# Patient Record
Sex: MALE | Race: BLACK OR AFRICAN AMERICAN | Employment: FULL TIME
[De-identification: names, ages, dates, MRNs, and addresses within clinical notes are randomized per-mention and may not be internally consistent; named-entity substitution may affect disease eponyms.]

---

## 2017-01-31 ENCOUNTER — TELEPHONE (OUTPATIENT)
Dept: INTERNAL MEDICINE | Facility: CLINIC | Age: 47
End: 2017-01-31

## 2017-01-31 ENCOUNTER — OFFICE VISIT (OUTPATIENT)
Dept: INTERNAL MEDICINE | Facility: CLINIC | Age: 47
End: 2017-01-31

## 2017-01-31 VITALS
TEMPERATURE: 98.4 F | DIASTOLIC BLOOD PRESSURE: 80 MMHG | OXYGEN SATURATION: 97 % | RESPIRATION RATE: 18 BRPM | WEIGHT: 303 LBS | BODY MASS INDEX: 38.89 KG/M2 | HEIGHT: 74 IN | SYSTOLIC BLOOD PRESSURE: 124 MMHG | HEART RATE: 68 BPM

## 2017-01-31 DIAGNOSIS — Z23 NEED FOR PNEUMOCOCCAL VACCINATION: ICD-10-CM

## 2017-01-31 DIAGNOSIS — G47.33 OSA ON CPAP: ICD-10-CM

## 2017-01-31 DIAGNOSIS — E66.9 OBESITY (BMI 35.0-39.9 WITHOUT COMORBIDITY): ICD-10-CM

## 2017-01-31 DIAGNOSIS — Z99.89 OSA ON CPAP: ICD-10-CM

## 2017-01-31 DIAGNOSIS — R42 VERTIGO: ICD-10-CM

## 2017-01-31 DIAGNOSIS — E11.9 TYPE 2 DIABETES MELLITUS WITHOUT COMPLICATION, WITHOUT LONG-TERM CURRENT USE OF INSULIN (HCC): ICD-10-CM

## 2017-01-31 DIAGNOSIS — H81.10 BPPV (BENIGN PAROXYSMAL POSITIONAL VERTIGO), UNSPECIFIED LATERALITY: Primary | ICD-10-CM

## 2017-01-31 DIAGNOSIS — R42 DIZZINESS: Primary | ICD-10-CM

## 2017-01-31 PROCEDURE — 90471 IMMUNIZATION ADMIN: CPT | Performed by: NURSE PRACTITIONER

## 2017-01-31 PROCEDURE — 90670 PCV13 VACCINE IM: CPT | Performed by: NURSE PRACTITIONER

## 2017-01-31 PROCEDURE — 99203 OFFICE O/P NEW LOW 30 MIN: CPT | Performed by: NURSE PRACTITIONER

## 2017-01-31 RX ORDER — MECLIZINE HYDROCHLORIDE 25 MG/1
25 TABLET ORAL
COMMUNITY
Start: 2017-01-28 | End: 2017-02-27

## 2017-01-31 RX ORDER — SCOLOPAMINE TRANSDERMAL SYSTEM 1 MG/1
1 PATCH, EXTENDED RELEASE TRANSDERMAL
Qty: 10 PATCH | Refills: 0 | Status: SHIPPED | OUTPATIENT
Start: 2017-01-31 | End: 2017-03-03 | Stop reason: RX

## 2017-01-31 ASSESSMENT — ENCOUNTER SYMPTOMS
SHORTNESS OF BREATH: 0
PHOTOPHOBIA: 0
CONSTIPATION: 0
COUGH: 0
DIARRHEA: 0
VISUAL CHANGE: 0
RHINORRHEA: 0
SORE THROAT: 0

## 2017-02-16 ENCOUNTER — TELEPHONE (OUTPATIENT)
Dept: INTERNAL MEDICINE | Facility: CLINIC | Age: 47
End: 2017-02-16

## 2017-03-03 ENCOUNTER — OFFICE VISIT (OUTPATIENT)
Dept: INTERNAL MEDICINE | Facility: CLINIC | Age: 47
End: 2017-03-03

## 2017-03-03 VITALS
HEIGHT: 74 IN | BODY MASS INDEX: 38.89 KG/M2 | HEART RATE: 76 BPM | RESPIRATION RATE: 18 BRPM | DIASTOLIC BLOOD PRESSURE: 80 MMHG | WEIGHT: 303 LBS | SYSTOLIC BLOOD PRESSURE: 105 MMHG

## 2017-03-03 DIAGNOSIS — E11.9 TYPE 2 DIABETES MELLITUS WITHOUT COMPLICATION, WITHOUT LONG-TERM CURRENT USE OF INSULIN (HCC): Chronic | ICD-10-CM

## 2017-03-03 DIAGNOSIS — Z13.1 DM (DIABETES MELLITUS SCREEN): Primary | ICD-10-CM

## 2017-03-03 DIAGNOSIS — E55.9 VITAMIN D DEFICIENCY: ICD-10-CM

## 2017-03-03 LAB — HBA1C MFR BLD: 7.9 %

## 2017-03-03 PROCEDURE — 99214 OFFICE O/P EST MOD 30 MIN: CPT | Performed by: INTERNAL MEDICINE

## 2017-03-03 PROCEDURE — 83036 HEMOGLOBIN GLYCOSYLATED A1C: CPT | Performed by: INTERNAL MEDICINE

## 2017-03-03 RX ORDER — ERGOCALCIFEROL 1.25 MG/1
50000 CAPSULE ORAL WEEKLY
Qty: 6 CAPSULE | Refills: 0 | Status: SHIPPED | OUTPATIENT
Start: 2017-03-03 | End: 2017-11-09

## 2017-03-03 ASSESSMENT — ENCOUNTER SYMPTOMS
SHORTNESS OF BREATH: 0
NAUSEA: 0
BACK PAIN: 0
EYE REDNESS: 0
COUGH: 0
EYE DISCHARGE: 0
SORE THROAT: 0
ABDOMINAL PAIN: 0
TROUBLE SWALLOWING: 0
VOMITING: 0

## 2017-04-05 ENCOUNTER — HOSPITAL ENCOUNTER (OUTPATIENT)
Age: 47
Setting detail: SPECIMEN
Discharge: HOME | End: 2017-04-05
Payer: OTHER GOVERNMENT

## 2017-04-05 ENCOUNTER — OFFICE VISIT (OUTPATIENT)
Dept: INTERNAL MEDICINE CLINIC | Age: 47
End: 2017-04-05
Payer: OTHER GOVERNMENT

## 2017-04-05 VITALS
TEMPERATURE: 99.1 F | BODY MASS INDEX: 39.86 KG/M2 | HEIGHT: 74 IN | RESPIRATION RATE: 17 BRPM | OXYGEN SATURATION: 98 % | WEIGHT: 310.6 LBS | HEART RATE: 78 BPM | SYSTOLIC BLOOD PRESSURE: 138 MMHG | DIASTOLIC BLOOD PRESSURE: 90 MMHG

## 2017-04-05 DIAGNOSIS — E11.9 TYPE 2 DIABETES MELLITUS WITHOUT COMPLICATION, WITHOUT LONG-TERM CURRENT USE OF INSULIN (HCC): Primary | Chronic | ICD-10-CM

## 2017-04-05 DIAGNOSIS — G47.33 OSA ON CPAP: ICD-10-CM

## 2017-04-05 DIAGNOSIS — Z99.89 OSA ON CPAP: ICD-10-CM

## 2017-04-05 DIAGNOSIS — Z13.9 SCREENING: ICD-10-CM

## 2017-04-05 DIAGNOSIS — N20.0 KIDNEY STONE: ICD-10-CM

## 2017-04-05 DIAGNOSIS — E11.9 TYPE 2 DIABETES MELLITUS WITHOUT COMPLICATION, WITHOUT LONG-TERM CURRENT USE OF INSULIN (HCC): Chronic | ICD-10-CM

## 2017-04-05 DIAGNOSIS — E55.9 VITAMIN D DEFICIENCY: ICD-10-CM

## 2017-04-05 DIAGNOSIS — M23.92 INTERNAL DERANGEMENT OF BOTH KNEES: ICD-10-CM

## 2017-04-05 DIAGNOSIS — M23.91 INTERNAL DERANGEMENT OF BOTH KNEES: ICD-10-CM

## 2017-04-05 LAB
ALBUMIN SERPL-MCNC: 4.4 G/DL (ref 3.5–5.2)
ALBUMIN/GLOBULIN RATIO: 1.5 (ref 1–2.5)
ALP BLD-CCNC: 85 U/L (ref 40–129)
ALT SERPL-CCNC: 28 U/L (ref 5–41)
ANION GAP SERPL CALCULATED.3IONS-SCNC: 14 MMOL/L (ref 9–17)
AST SERPL-CCNC: 18 U/L
BILIRUB SERPL-MCNC: 0.31 MG/DL (ref 0.3–1.2)
BUN BLDV-MCNC: 17 MG/DL (ref 6–20)
BUN/CREAT BLD: ABNORMAL (ref 9–20)
CALCIUM SERPL-MCNC: 9.4 MG/DL (ref 8.6–10.4)
CHLORIDE BLD-SCNC: 101 MMOL/L (ref 98–107)
CHOLESTEROL/HDL RATIO: 3.9
CHOLESTEROL: 154 MG/DL
CO2: 26 MMOL/L (ref 20–31)
CREAT SERPL-MCNC: 1.05 MG/DL (ref 0.7–1.2)
GFR AFRICAN AMERICAN: >60 ML/MIN
GFR NON-AFRICAN AMERICAN: >60 ML/MIN
GFR SERPL CREATININE-BSD FRML MDRD: ABNORMAL ML/MIN/{1.73_M2}
GFR SERPL CREATININE-BSD FRML MDRD: ABNORMAL ML/MIN/{1.73_M2}
GLUCOSE BLD-MCNC: 124 MG/DL (ref 70–99)
HDLC SERPL-MCNC: 39 MG/DL
LDL CHOLESTEROL: 89 MG/DL (ref 0–130)
POTASSIUM SERPL-SCNC: 4.7 MMOL/L (ref 3.7–5.3)
PROSTATE SPECIFIC ANTIGEN: 0.51 UG/L
SODIUM BLD-SCNC: 141 MMOL/L (ref 135–144)
TOTAL PROTEIN: 7.4 G/DL (ref 6.4–8.3)
TRIGL SERPL-MCNC: 132 MG/DL
VLDLC SERPL CALC-MCNC: ABNORMAL MG/DL (ref 1–30)

## 2017-04-05 PROCEDURE — 99214 OFFICE O/P EST MOD 30 MIN: CPT | Performed by: FAMILY MEDICINE

## 2017-04-05 RX ORDER — METFORMIN HYDROCHLORIDE 500 MG/1
TABLET, EXTENDED RELEASE ORAL
Qty: 60 TABLET | Refills: 5 | Status: SHIPPED | COMMUNITY
Start: 2017-04-05 | End: 2017-04-24 | Stop reason: SDUPTHER

## 2017-04-05 RX ORDER — VALSARTAN 80 MG/1
80 TABLET ORAL DAILY
Qty: 30 TABLET | Refills: 3 | Status: SHIPPED | OUTPATIENT
Start: 2017-04-05 | End: 2017-11-09

## 2017-04-05 RX ORDER — METFORMIN HYDROCHLORIDE 500 MG/1
TABLET, EXTENDED RELEASE ORAL
COMMUNITY
Start: 2017-03-25 | End: 2017-04-05 | Stop reason: DRUGHIGH

## 2017-04-05 RX ORDER — BLOOD-GLUCOSE METER
KIT MISCELLANEOUS
Refills: 0 | COMMUNITY
Start: 2017-01-31 | End: 2017-11-09

## 2017-04-05 ASSESSMENT — ENCOUNTER SYMPTOMS
COLOR CHANGE: 0
TROUBLE SWALLOWING: 0
EYE PAIN: 0
CONSTIPATION: 0
BACK PAIN: 0
EYE DISCHARGE: 0
ANAL BLEEDING: 0
SHORTNESS OF BREATH: 0
EYE REDNESS: 0
ABDOMINAL PAIN: 0
FACIAL SWELLING: 0
COUGH: 0
CHEST TIGHTNESS: 0
STRIDOR: 0
WHEEZING: 0
SORE THROAT: 0
ABDOMINAL DISTENTION: 0

## 2017-04-17 ENCOUNTER — TELEPHONE (OUTPATIENT)
Dept: INTERNAL MEDICINE CLINIC | Age: 47
End: 2017-04-17

## 2017-04-27 RX ORDER — METFORMIN HYDROCHLORIDE 500 MG/1
TABLET, EXTENDED RELEASE ORAL
Qty: 60 TABLET | Refills: 0 | Status: SHIPPED | OUTPATIENT
Start: 2017-04-27 | End: 2017-10-27 | Stop reason: SDUPTHER

## 2017-05-12 DIAGNOSIS — E55.9 VITAMIN D DEFICIENCY: ICD-10-CM

## 2017-05-15 RX ORDER — ERGOCALCIFEROL 1.25 MG/1
CAPSULE ORAL
Qty: 4 CAPSULE | Refills: 3 | Status: SHIPPED | OUTPATIENT
Start: 2017-05-15 | End: 2017-11-09

## 2017-09-12 ENCOUNTER — TELEPHONE (OUTPATIENT)
Dept: PRIMARY CARE CLINIC | Age: 47
End: 2017-09-12

## 2017-10-27 NOTE — TELEPHONE ENCOUNTER
Pt requesting refills on Metformin. Pharmacy on record is correct. Any questions patient's phone number is [phone].

## 2017-10-30 RX ORDER — METFORMIN HYDROCHLORIDE 500 MG/1
TABLET, EXTENDED RELEASE ORAL
Qty: 60 TABLET | Refills: 0 | Status: SHIPPED | OUTPATIENT
Start: 2017-10-30 | End: 2017-11-27 | Stop reason: SDUPTHER

## 2017-11-09 ENCOUNTER — OFFICE VISIT (OUTPATIENT)
Dept: INTERNAL MEDICINE CLINIC | Age: 47
End: 2017-11-09
Payer: OTHER GOVERNMENT

## 2017-11-09 VITALS
OXYGEN SATURATION: 98 % | HEART RATE: 78 BPM | RESPIRATION RATE: 17 BRPM | SYSTOLIC BLOOD PRESSURE: 130 MMHG | BODY MASS INDEX: 40.43 KG/M2 | HEIGHT: 74 IN | DIASTOLIC BLOOD PRESSURE: 80 MMHG | WEIGHT: 315 LBS

## 2017-11-09 DIAGNOSIS — Z99.89 OSA ON CPAP: ICD-10-CM

## 2017-11-09 DIAGNOSIS — E66.9 OBESITY (BMI 35.0-39.9 WITHOUT COMORBIDITY): ICD-10-CM

## 2017-11-09 DIAGNOSIS — E78.49 OTHER HYPERLIPIDEMIA: ICD-10-CM

## 2017-11-09 DIAGNOSIS — I10 ESSENTIAL HYPERTENSION: ICD-10-CM

## 2017-11-09 DIAGNOSIS — E11.9 CONTROLLED TYPE 2 DIABETES MELLITUS WITHOUT COMPLICATION, WITHOUT LONG-TERM CURRENT USE OF INSULIN (HCC): Primary | ICD-10-CM

## 2017-11-09 DIAGNOSIS — G47.33 OSA ON CPAP: ICD-10-CM

## 2017-11-09 PROBLEM — L29.9 ITCHING: Status: ACTIVE | Noted: 2017-10-16
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
## 2017-11-09 PROCEDURE — 90472 IMMUNIZATION ADMIN EACH ADD: CPT | Performed by: FAMILY MEDICINE

## 2017-11-09 PROCEDURE — 90732 PPSV23 VACC 2 YRS+ SUBQ/IM: CPT | Performed by: FAMILY MEDICINE

## 2017-11-09 PROCEDURE — 90471 IMMUNIZATION ADMIN: CPT | Performed by: FAMILY MEDICINE

## 2017-11-09 PROCEDURE — 90688 IIV4 VACCINE SPLT 0.5 ML IM: CPT | Performed by: FAMILY MEDICINE

## 2017-11-09 PROCEDURE — 99214 OFFICE O/P EST MOD 30 MIN: CPT | Performed by: FAMILY MEDICINE

## 2017-11-09 RX ORDER — ATORVASTATIN CALCIUM 40 MG/1
40 TABLET, FILM COATED ORAL DAILY
Qty: 30 TABLET | Refills: 3 | Status: SHIPPED | OUTPATIENT
Start: 2017-11-09 | End: 2018-02-09

## 2017-11-09 RX ORDER — CETIRIZINE HYDROCHLORIDE 10 MG/1
TABLET ORAL
Refills: 0 | COMMUNITY
Start: 2017-10-16 | End: 2017-11-09 | Stop reason: SDUPTHER

## 2017-11-09 RX ORDER — ASPIRIN 81 MG/1
81 TABLET, CHEWABLE ORAL DAILY
Qty: 30 TABLET | Refills: 3 | Status: SHIPPED | OUTPATIENT
Start: 2017-11-09 | End: 2018-02-09

## 2017-11-09 RX ORDER — LISINOPRIL 5 MG/1
5 TABLET ORAL DAILY
Qty: 30 TABLET | Refills: 3 | Status: SHIPPED | OUTPATIENT
Start: 2017-11-09 | End: 2018-02-09

## 2017-11-09 ASSESSMENT — ENCOUNTER SYMPTOMS
RESPIRATORY NEGATIVE: 1
GASTROINTESTINAL NEGATIVE: 1
ALLERGIC/IMMUNOLOGIC NEGATIVE: 1
EYES NEGATIVE: 1

## 2017-11-09 ASSESSMENT — PATIENT HEALTH QUESTIONNAIRE - PHQ9
SUM OF ALL RESPONSES TO PHQ9 QUESTIONS 1 & 2: 0
2. FEELING DOWN, DEPRESSED OR HOPELESS: 0
1. LITTLE INTEREST OR PLEASURE IN DOING THINGS: 0
SUM OF ALL RESPONSES TO PHQ QUESTIONS 1-9: 0

## 2017-11-09 NOTE — PROGRESS NOTES
Subjective:      Patient ID: Ar Allan is a 56 y.o. male. Diabetes   He presents for his follow-up diabetic visit. He has type 2 diabetes mellitus. His disease course has been stable. There are no hypoglycemic associated symptoms. There are no diabetic associated symptoms. There are no hypoglycemic complications. Symptoms are stable. There are no diabetic complications. Risk factors for coronary artery disease include diabetes mellitus, dyslipidemia, obesity, male sex and stress. Current diabetic treatment includes oral agent (monotherapy). He is compliant with treatment all of the time. His weight is fluctuating minimally. He is following a generally unhealthy, high fat/cholesterol, high salt and low fiber diet. Meal planning includes ADA exchanges, avoidance of concentrated sweets, calorie counting and carbohydrate counting. He has had a previous visit with a dietitian. He participates in exercise daily. Home blood sugar record trend: He did not bring his Accu-Chek log. An ACE inhibitor/angiotensin II receptor blocker is being taken. Eye exam is current. Review of Systems   Constitutional: Negative. HENT: Negative. Eyes: Negative. Respiratory: Negative. Cardiovascular: Negative. Gastrointestinal: Negative. Endocrine: Negative. Musculoskeletal: Negative. Skin: Negative. Allergic/Immunologic: Negative. Neurological: Negative. Hematological: Negative. Psychiatric/Behavioral: Negative. Past family and social history unremarkable. Objective:   Physical Exam   Constitutional: He is oriented to person, place, and time. He appears well-developed and well-nourished. Morbid obesity with sleep apnea on CPAP   HENT:   Head: Normocephalic and atraumatic.    Right Ear: External ear normal.   Left Ear: External ear normal.   Nose: Nose normal.   Mouth/Throat: Oropharynx is clear and moist.   Eyes: Conjunctivae and EOM are normal. Pupils are equal, round, and reactive to light.   Neck: Normal range of motion. Neck supple. Cardiovascular: Normal rate, regular rhythm, normal heart sounds and intact distal pulses. Pulmonary/Chest: Effort normal and breath sounds normal.   Abdominal: Soft. Bowel sounds are normal.   Musculoskeletal: Normal range of motion. Neurological: He is alert and oriented to person, place, and time. He has normal reflexes. Skin: Skin is warm and dry. Psychiatric: He has a normal mood and affect. His behavior is normal. Thought content normal.       Assessment:      1. Controlled type 2 diabetes mellitus without complication, without long-term current use of insulin ([facility])  Microalbumin, Ur    Hemoglobin A1C    HIV Screen    Hepatitis Panel, Acute    CBC    Comprehensive Metabolic Panel    Lipid Panel    Urinalysis with Microscopic   2. MARCELLUS on CPAP     3. Essential hypertension     4. Other hyperlipidemia     5. Obesity (BMI 35.0-39.9 without comorbidity)             Plan:      64-year-old male who is a male carrier is presented for follow-up. He is afebrile hemodynamically stable, clinical examination is benign. Diabetes mellitus type 2 on metformin that he is tolerating well. Recent past A1c 7.9. I advised him to keep Accu-Chek log for office review, adhere to current regimen, ADA 1800 diet, daily moderate exercise and lifestyle change  Morbid obesity with sleep apnea. We aim to keep his BMI 27 below. I strongly urged him to consider bariatric evaluation. Continue CPAP that he is compliant with. Hypertension well controlled. Consume less than 2 g of salt diet significant loose weight. Hyperlipidemia and start at least tolerating well. He denies tobacco, excessive alcohol or illicit drug use  History of anxiety however denies depression. I offered antianxiety medications that he declined.   Further recommendations to follow lab  Monofilament testing is unremarkable  He is advised to update his annual dilated eye exam  This note is created with

## 2017-11-21 ENCOUNTER — HOSPITAL ENCOUNTER (OUTPATIENT)
Age: 47
Setting detail: SPECIMEN
Discharge: HOME | End: 2017-11-21
Payer: OTHER GOVERNMENT

## 2017-11-21 DIAGNOSIS — E11.9 CONTROLLED TYPE 2 DIABETES MELLITUS WITHOUT COMPLICATION, WITHOUT LONG-TERM CURRENT USE OF INSULIN (HCC): ICD-10-CM

## 2017-11-21 LAB
-: ABNORMAL
ALBUMIN SERPL-MCNC: 4.5 G/DL (ref 3.5–5.2)
ALBUMIN/GLOBULIN RATIO: 1.5 (ref 1–2.5)
ALP BLD-CCNC: 84 U/L (ref 40–129)
ALT SERPL-CCNC: 34 U/L (ref 5–41)
AMORPHOUS: ABNORMAL
ANION GAP SERPL CALCULATED.3IONS-SCNC: 13 MMOL/L (ref 9–17)
AST SERPL-CCNC: 20 U/L
BACTERIA: ABNORMAL
BILIRUB SERPL-MCNC: 0.36 MG/DL (ref 0.3–1.2)
BILIRUBIN URINE: NEGATIVE
BUN BLDV-MCNC: 13 MG/DL (ref 6–20)
BUN/CREAT BLD: ABNORMAL (ref 9–20)
CALCIUM SERPL-MCNC: 9.5 MG/DL (ref 8.6–10.4)
CASTS UA: ABNORMAL /LPF (ref 0–8)
CHLORIDE BLD-SCNC: 99 MMOL/L (ref 98–107)
CHOLESTEROL/HDL RATIO: 4
CHOLESTEROL: 162 MG/DL
CO2: 28 MMOL/L (ref 20–31)
COLOR: ABNORMAL
CREAT SERPL-MCNC: 0.8 MG/DL (ref 0.7–1.2)
CREATININE URINE: 256.8 MG/DL (ref 39–259)
CRYSTALS, UA: ABNORMAL /HPF
EPITHELIAL CELLS UA: ABNORMAL /HPF (ref 0–5)
GFR AFRICAN AMERICAN: >60 ML/MIN
GFR NON-AFRICAN AMERICAN: >60 ML/MIN
GFR SERPL CREATININE-BSD FRML MDRD: ABNORMAL ML/MIN/{1.73_M2}
GFR SERPL CREATININE-BSD FRML MDRD: ABNORMAL ML/MIN/{1.73_M2}
GLUCOSE BLD-MCNC: 121 MG/DL (ref 70–99)
GLUCOSE URINE: NEGATIVE
HAV IGM SER IA-ACNC: NONREACTIVE
HCT VFR BLD CALC: 45.6 % (ref 40.7–50.3)
HDLC SERPL-MCNC: 41 MG/DL
HEMOGLOBIN: 14.3 G/DL (ref 13–17)
HEPATITIS B CORE IGM ANTIBODY: NONREACTIVE
HEPATITIS B SURFACE ANTIGEN: NONREACTIVE
HEPATITIS C ANTIBODY: NONREACTIVE
HIV AG/AB: NONREACTIVE
KETONES, URINE: ABNORMAL
LDL CHOLESTEROL: 103 MG/DL (ref 0–130)
LEUKOCYTE ESTERASE, URINE: NEGATIVE
MCH RBC QN AUTO: 28.3 PG (ref 25.2–33.5)
MCHC RBC AUTO-ENTMCNC: 31.4 G/DL (ref 28.4–34.8)
MCV RBC AUTO: 90.3 FL (ref 82.6–102.9)
MICROALBUMIN/CREAT 24H UR: <12 MG/L
MICROALBUMIN/CREAT UR-RTO: NORMAL MCG/MG CREAT
MUCUS: ABNORMAL
NITRITE, URINE: NEGATIVE
OTHER OBSERVATIONS UA: ABNORMAL
PDW BLD-RTO: 13 % (ref 11.8–14.4)
PH UA: 5 (ref 5–8)
PLATELET # BLD: 247 K/UL (ref 138–453)
PMV BLD AUTO: 11.3 FL (ref 8.1–13.5)
POTASSIUM SERPL-SCNC: 4.2 MMOL/L (ref 3.7–5.3)
PROTEIN UA: NEGATIVE
RBC # BLD: 5.05 M/UL (ref 4.21–5.77)
RBC UA: ABNORMAL /HPF (ref 0–4)
RENAL EPITHELIAL, UA: ABNORMAL /HPF
SODIUM BLD-SCNC: 140 MMOL/L (ref 135–144)
SPECIFIC GRAVITY UA: 1.02 (ref 1–1.03)
TOTAL PROTEIN: 7.6 G/DL (ref 6.4–8.3)
TRICHOMONAS: ABNORMAL
TRIGL SERPL-MCNC: 92 MG/DL
TURBIDITY: CLEAR
URINE HGB: NEGATIVE
UROBILINOGEN, URINE: NORMAL
VLDLC SERPL CALC-MCNC: NORMAL MG/DL (ref 1–30)
WBC # BLD: 6.3 K/UL (ref 3.5–11.3)
WBC UA: ABNORMAL /HPF (ref 0–5)
YEAST: ABNORMAL

## 2017-11-22 LAB
ESTIMATED AVERAGE GLUCOSE: 206 MG/DL
HBA1C MFR BLD: 8.8 % (ref 4–6)

## 2017-11-27 RX ORDER — METFORMIN HYDROCHLORIDE 500 MG/1
500 TABLET, EXTENDED RELEASE ORAL 2 TIMES DAILY
Qty: 60 TABLET | Refills: 3 | Status: SHIPPED | OUTPATIENT
Start: 2017-11-27 | End: 2018-03-29 | Stop reason: SDUPTHER

## 2017-11-27 NOTE — TELEPHONE ENCOUNTER
From: Anna Cardona  Sent: 11/26/2017 10:06 PM EST  Subject: Medication Renewal Request    Jay Emery would like a refill of the following medications:  metFORMIN (GLUCOPHAGE-XR) 500 MG extended release tablet Carl Napier MD]    Preferred pharmacy: [address] [phone] - F [phone]    Comment:

## 2018-01-02 RX ORDER — TAMSULOSIN HYDROCHLORIDE 0.4 MG/1
0.4 CAPSULE ORAL DAILY
Qty: 30 CAPSULE | Refills: 3 | Status: SHIPPED | OUTPATIENT
Start: 2018-01-02 | End: 2018-02-09

## 2018-02-09 ENCOUNTER — OFFICE VISIT (OUTPATIENT)
Dept: INTERNAL MEDICINE CLINIC | Age: 48
End: 2018-02-09
Payer: OTHER GOVERNMENT

## 2018-02-09 VITALS
HEART RATE: 78 BPM | SYSTOLIC BLOOD PRESSURE: 130 MMHG | OXYGEN SATURATION: 98 % | BODY MASS INDEX: 39.58 KG/M2 | WEIGHT: 308.4 LBS | DIASTOLIC BLOOD PRESSURE: 80 MMHG | HEIGHT: 74 IN | RESPIRATION RATE: 17 BRPM

## 2018-02-09 DIAGNOSIS — F39 MOOD DISORDER (HCC): ICD-10-CM

## 2018-02-09 DIAGNOSIS — E78.49 OTHER HYPERLIPIDEMIA: ICD-10-CM

## 2018-02-09 DIAGNOSIS — G47.33 OSA ON CPAP: ICD-10-CM

## 2018-02-09 DIAGNOSIS — Z99.89 OSA ON CPAP: ICD-10-CM

## 2018-02-09 DIAGNOSIS — E55.9 VITAMIN D DEFICIENCY: ICD-10-CM

## 2018-02-09 DIAGNOSIS — Z91.199 NON-COMPLIANT PATIENT: ICD-10-CM

## 2018-02-09 DIAGNOSIS — I10 ESSENTIAL HYPERTENSION: ICD-10-CM

## 2018-02-09 PROCEDURE — 99214 OFFICE O/P EST MOD 30 MIN: CPT | Performed by: FAMILY MEDICINE

## 2018-02-09 NOTE — PROGRESS NOTES
Chronic Disease Visit Information    BP Readings from Last 3 Encounters:   02/09/18 130/80   11/09/17 130/80   04/05/17 (!) 138/90          Hemoglobin A1C (%)   Date Value   11/21/2017 8.8 (H)   03/03/2017 7.9   07/01/2016 7.2     Microalb/Crt. Ratio (mcg/mg creat)   Date Value   11/21/2017 CANNOT BE CALCULATED     LDL Cholesterol (mg/dL)   Date Value   11/21/2017 103     HDL (mg/dL)   Date Value   11/21/2017 41     BUN (mg/dL)   Date Value   11/21/2017 13     CREATININE (mg/dL)   Date Value   11/21/2017 0.80     Glucose (mg/dL)   Date Value   11/21/2017 121 (H)            Have you changed or started any medications since your last visit including any over-the-counter medicines, vitamins, or herbal medicines? no   Are you having any side effects from any of your medications? -  no  Have you stopped taking any of your medications? Is so, why? -  no    Have you seen any other physician or provider since your last visit? No  Have you had any other diagnostic tests since your last visit? No  Have you been seen in the emergency room and/or had an admission to a hospital since we last saw you? No  Have you had your annual diabetic retinal (eye) exam? Yes - Records Requested  Have you had your routine dental cleaning in the past 6 months? yes -     Have you activated your 2nd Watch account? If not, what are your barriers?  Yes     Patient Care Team:  Dayton Souza MD as PCP - General (Family Medicine)         Medical History Review  Past Medical, Family, and Social History reviewed and does contribute to the patient presenting condition    Health Maintenance   Topic Date Due    Diabetic retinal exam  12/20/2018 (Originally 9/15/2016)    Diabetic foot exam  03/03/2018    A1C test (Diabetic or Prediabetic)  11/21/2018    Diabetic microalbuminuria test  11/21/2018    Lipid screen  11/21/2018    Potassium monitoring  11/21/2018    Creatinine monitoring  11/21/2018    DTaP/Tdap/Td vaccine (2 - Td) 05/06/2026    Flu vaccine  Completed    Pneumococcal med risk  Completed    HIV screen  Completed

## 2018-02-12 ASSESSMENT — ENCOUNTER SYMPTOMS
RESPIRATORY NEGATIVE: 1
ALLERGIC/IMMUNOLOGIC NEGATIVE: 1
GASTROINTESTINAL NEGATIVE: 1
EYES NEGATIVE: 1

## 2018-02-12 NOTE — PROGRESS NOTES
Effort normal and breath sounds normal.   Abdominal: Soft. Bowel sounds are normal.   Musculoskeletal: Normal range of motion. Neurological: He is alert and oriented to person, place, and time. He has normal reflexes. Skin: Skin is warm and dry. Psychiatric: He has a normal mood and affect. His behavior is normal. Thought content normal.       Assessment:      1. Uncontrolled type 2 diabetes mellitus without complication, without long-term current use of insulin ([address]     2. Essential hypertension     3. Other hyperlipidemia     4. Vitamin D deficiency     5. MARCELLUS on CPAP     6. Non-compliant patient     7. Mood disorder [facility])             Plan:      52-year-old male returns for follow-up. He is afebrile hemodynamically stable   dental pain for which he is on antibiotic pending evaluation by his dentist  Non-insulin-dependent diabetes mellitus that appears to show worsening. Patient is noncompliant with ADA 1800 diet. Compliance is advised. He is also advised to comply with medications, keep Accu-Chek log for office review, low-fat high-fiber diet, daily moderate exercise, lifestyle change and weight loss to keep BMI 27. A1c has worsened to 8.8. He is normotensive. Blood pressure log equal or less than 130/80. Lose weight and consume less than 2 g of salt a day  Hyperlipidemia and statin that he is tolerating well. Obesity with sleep apnea. Advised to comply with CPAP  Monofilament testing is unremarkable  Is advised to update annual dilated eye exam  Former smoker however, denies ongoing use, he denies excessive alcohol or illicit drug use  Observe and call for any concern  Further recommendations to follow  This note is created with a voice recognition program and while intend to generate a document that accurately reflects the content of the visit, no guarantee can be provided that every mistake has been identified and corrected by editing. Generalized anxiety disorder. Baseline stable.   He denies suicidality  Further recommendations to follow  This note is created with a voice recognition program and while intend to generate a document that accurately reflects the content of the visit, no guarantee can be provided that every mistake has been identified and corrected by editing.

## 2018-03-29 RX ORDER — METFORMIN HYDROCHLORIDE 500 MG/1
500 TABLET, EXTENDED RELEASE ORAL 2 TIMES DAILY
Qty: 60 TABLET | Refills: 3 | Status: SHIPPED | OUTPATIENT
Start: 2018-03-29 | End: 2018-08-01 | Stop reason: SDUPTHER

## 2018-08-02 RX ORDER — METFORMIN HYDROCHLORIDE 500 MG/1
TABLET, EXTENDED RELEASE ORAL
Qty: 60 TABLET | Refills: 0 | Status: SHIPPED | OUTPATIENT
Start: 2018-08-02 | End: 2018-08-14

## 2018-08-02 RX ORDER — METFORMIN HYDROCHLORIDE 500 MG/1
500 TABLET, EXTENDED RELEASE ORAL 2 TIMES DAILY
Qty: 30 TABLET | Refills: 0 | Status: SHIPPED | OUTPATIENT
Start: 2018-08-02 | End: 2018-08-14

## 2018-08-02 RX ORDER — METFORMIN HYDROCHLORIDE 500 MG/1
500 TABLET, EXTENDED RELEASE ORAL 2 TIMES DAILY
Qty: 60 TABLET | Refills: 0 | Status: SHIPPED | OUTPATIENT
Start: 2018-08-02 | End: 2018-08-14 | Stop reason: SDUPTHER

## 2018-08-04 ENCOUNTER — APPOINTMENT (OUTPATIENT)
Dept: GENERAL RADIOLOGY | Age: 48
End: 2018-08-04
Payer: COMMERCIAL

## 2018-08-04 ENCOUNTER — HOSPITAL ENCOUNTER (EMERGENCY)
Age: 48
Discharge: HOME | End: 2018-08-04
Attending: EMERGENCY MEDICINE
Payer: COMMERCIAL

## 2018-08-04 VITALS
RESPIRATION RATE: 18 BRPM | BODY MASS INDEX: 39.14 KG/M2 | TEMPERATURE: 98.4 F | WEIGHT: 305 LBS | DIASTOLIC BLOOD PRESSURE: 73 MMHG | OXYGEN SATURATION: 97 % | HEIGHT: 74 IN | HEART RATE: 73 BPM | SYSTOLIC BLOOD PRESSURE: 134 MMHG

## 2018-08-04 DIAGNOSIS — Z77.098 CHEMICAL EXPOSURE: Primary | ICD-10-CM

## 2018-08-04 PROCEDURE — 71046 X-RAY EXAM CHEST 2 VIEWS: CPT

## 2018-08-04 PROCEDURE — 99283 EMERGENCY DEPT VISIT LOW MDM: CPT

## 2018-08-04 ASSESSMENT — ENCOUNTER SYMPTOMS
COLOR CHANGE: 1
EYE DISCHARGE: 0
EYE ITCHING: 0
COUGH: 0
PHOTOPHOBIA: 0
VOMITING: 0
NAUSEA: 0
SHORTNESS OF BREATH: 0
EYE PAIN: 0
EYE REDNESS: 0
CHEST TIGHTNESS: 0

## 2018-08-04 ASSESSMENT — PAIN DESCRIPTION - FREQUENCY: FREQUENCY: CONTINUOUS

## 2018-08-04 ASSESSMENT — PAIN DESCRIPTION - LOCATION: LOCATION: GENERALIZED

## 2018-08-04 ASSESSMENT — PAIN SCALES - GENERAL: PAINLEVEL_OUTOF10: 6

## 2018-08-04 NOTE — ED PROVIDER NOTES
4, 8 or more for level 5)     ED Triage Vitals [08/04/18 1017]   BP Temp Temp Source Pulse Resp SpO2 Height Weight   134/73 98.4 °F (36.9 °C) Oral 73 18 97 % 6' 2\" (1.88 m) (!) 305 lb (138.3 kg)       Physical Exam   Constitutional: He is oriented to person, place, and time. He appears well-developed and well-nourished. HENT:   Head: Normocephalic and atraumatic. Bilateral nares are without erythema, irritation or edema. Nose hairs without damage. Posterior oropharynx is clear and well hydrated. Eyes: Conjunctivae and EOM are normal. Pupils are equal, round, and reactive to light. Neck: Neck supple. Cardiovascular: Normal rate and regular rhythm. Pulmonary/Chest: Effort normal and breath sounds normal. No respiratory distress. Abdominal: Soft. He exhibits no distension. There is no tenderness. Lymphadenopathy:     He has no cervical adenopathy. Neurological: He is alert and oriented to person, place, and time. Skin: Skin is warm and dry. No erythema or rash       DIAGNOSTIC RESULTS     EKG: All EKG's are interpreted by the Emergency Department Physician who either signs or Co-signs this chart in the absence of a cardiologist.    RADIOLOGY:   Non-plain film images such as CT, Ultrasound and MRI are read by the radiologist. Plain radiographic images are visualized and preliminarily interpreted by the emergency physician with the below findings:    Interpretation per the Radiologist below, if available at the time of this note:    XR CHEST STANDARD (2 VW)   Final Result   No acute airspace disease identified. LABS:  Labs Reviewed - No data to display    All other labs were within normal range or not returned as of this dictation.     EMERGENCY DEPARTMENT COURSE and DIFFERENTIAL DIAGNOSIS/MDM:   Vitals:    Vitals:    08/04/18 1017   BP: 134/73   Pulse: 73   Resp: 18   Temp: 98.4 °F (36.9 °C)   TempSrc: Oral   SpO2: 97%   Weight: (!) 305 lb (138.3 kg)   Height: 6' 2\" (1.88 m)

## 2018-08-14 PROBLEM — R53.83 FATIGUE: Status: ACTIVE | Noted: 2018-08-14

## 2018-09-04 PROBLEM — M79.671 PAIN OF RIGHT HEEL: Status: ACTIVE | Noted: 2018-09-04

## 2019-04-15 ENCOUNTER — OFFICE VISIT (OUTPATIENT)
Dept: PODIATRY | Age: 49
End: 2019-04-15
Payer: OTHER GOVERNMENT

## 2019-04-15 VITALS
BODY MASS INDEX: 26.69 KG/M2 | DIASTOLIC BLOOD PRESSURE: 81 MMHG | WEIGHT: 208 LBS | HEIGHT: 74 IN | HEART RATE: 74 BPM | SYSTOLIC BLOOD PRESSURE: 137 MMHG

## 2019-04-15 DIAGNOSIS — B35.3 TINEA PEDIS OF BOTH FEET: ICD-10-CM

## 2019-04-15 DIAGNOSIS — L74.513 HYPERHIDROSIS OF FEET: ICD-10-CM

## 2019-04-15 DIAGNOSIS — E11.65 UNCONTROLLED TYPE 2 DIABETES MELLITUS WITH HYPERGLYCEMIA (HCC): Primary | ICD-10-CM

## 2019-04-15 PROCEDURE — 99202 OFFICE O/P NEW SF 15 MIN: CPT | Performed by: STUDENT IN AN ORGANIZED HEALTH CARE EDUCATION/TRAINING PROGRAM

## 2019-04-15 PROCEDURE — 99203 OFFICE O/P NEW LOW 30 MIN: CPT | Performed by: STUDENT IN AN ORGANIZED HEALTH CARE EDUCATION/TRAINING PROGRAM

## 2019-04-15 RX ORDER — MINOCYCLINE HYDROCHLORIDE 100 MG/1
CAPSULE ORAL
Refills: 0 | COMMUNITY
Start: 2019-03-25 | End: 2019-08-09

## 2019-04-15 RX ORDER — ERYTHROMYCIN 20 MG/ML
SOLUTION TOPICAL
Refills: 3 | COMMUNITY
Start: 2019-03-25 | End: 2019-06-13

## 2019-04-15 NOTE — LETTER
[facility]  [address]  [address]  [address] [place] [address]  Phone: [phone]  Fax: [phone] [place], [place]        April 15, 2019     Patient: Xochitl Hoffman   YOB: 1970   Date of Visit: 4/15/2019       To Whom It May Concern: It is my medical opinion that Veta Osgood may wear the shoes of his choice due to foot and knee problems. .     If you have any questions or concerns, please don't hesitate to call.     Sincerely,        Noemi Oropeza DPM

## 2019-04-15 NOTE — LETTER
[facility]  [address]  [address]  [address] [facility] [address]  Phone: [phone]  Fax: [phone] [facility]        April 15, 2019     Patient: Audra Chaudhry   YOB: 1970   Date of Visit: 4/15/2019       To Whom It May Concern: It is my medical opinion that Amarilis Patel may return to full duty immediately with no restrictions. Patient will need wide shoes due to the shape of his foot. If you have any questions or concerns, please don't hesitate to call.     Sincerely,        Tina Rojas DPM

## 2019-04-15 NOTE — PROGRESS NOTES
[address]  [address] [place] [address],  S Aram Ortega  Tel: [phone]   Fax: [phone]    Subjective     CC: Malodorous feet    HPI:  Marline Pack is a 51y.o. year old male who presents to clinic today with malodorous feet. Patient states he noticed his feet have an odor for many years. He admits to having excessive sweating in both feet. He denies any itching to the bottoms of both feet. He is a diabetic, his last HgA1c was 9.5 in August. He states he is starting to work at the [employer] after being off for 2 years. 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 Primary care physician is Mandy Montesinos MD.    ROS:    Constitutional: Denies nausea, vomiting, fever, chills. Neurologic: Denies numbness, tingling, and burning in the feet. Vascular: Denies symptoms of lower extremity claudication. Skin: Denies open wounds. Otherwise negative except as noted in the HPI. PMH:  Past Medical History:   Diagnosis Date    Diabetes mellitus ([facility]     Kidney stone 2012    Kidney stone     Sleep apnea     wears cpap at night       Surgical History:   Past Surgical History:   Procedure Laterality Date    LITHOTRIPSY         Social History:  Social History     Tobacco Use    Smoking status: Former Smoker     Packs/day: 0.50     Last attempt to quit: 2002     Years since quittin.7    Smokeless tobacco: Never Used   Substance Use Topics    Alcohol use: No    Drug use: No       Medications:  Prior to Admission medications    Medication Sig Start Date End Date Taking?  Authorizing Provider   minocycline (MINOCIN;DYNACIN) 100 MG capsule take 1 capsule by mouth once daily 3/25/19  Yes Historical Provider, MD   erythromycin with ethanol (THERAMYCIN) 2 % external solution apply every morning 3/25/19  Yes Historical Provider, MD   metFORMIN (GLUCOPHAGE) 1000 MG tablet Take 1 tablet by mouth 2 times daily (with meals) 18  Yes Remy Venegas MD   triamcinolone (ARISTOCORT) 0.5 % cream Apply topically 6/12/18   Historical Provider, MD   atorvastatin (LIPITOR) 10 MG tablet Take 1 tablet by mouth daily 10/16/18   Remy Venegas MD   losartan (COZAAR) 50 MG tablet Take 1 tablet by mouth daily 9/4/18   Remy Venegas MD   ibuprofen (ADVIL;MOTRIN) 800 MG tablet  8/11/18   Historical Provider, MD       Objective     Vitals:    04/15/19 1256   BP: 137/81   Pulse: 74       Lab Results   Component Value Date    LABA1C 9.5 08/29/2018       Physical Exam:  General:  Alert and oriented x3. In no acute distress. Lower Extremity Physical Exam:    Vascular: DP and PT pulses are palpable, Bilateral. CFT <3 seconds to all digits, Bilateral.  No edema, Bilateral.  Hair growth is present to the level of the digits, Bilateral.     Neuro: Saph/sural/SP/DP/plantar sensation intact to light touch. Protective sensation is intact to 10/10 sites as tested with a 5.07g SWMF, Bilateral.     Musculoskeletal: EHL/FHL/GS/TA gross motor intact. Tenderness to palpation absent to the feet. Gross deformity is present, Bilateral with an abducted forefoot. Pes planus foot type. Dermatologic: No open lesions, Bilateral.  Interdigital maceration absent, Bilateral.  Nails 1-10 are within normal limits for length and thickness. Yellow discoloration of hallux nails bilateral.     Biomechanical Exam:    Right foot: 1st MTPJ: Loaded:45 Unloaded:45  Right foot: 1st Ray: 4mm      Right foot: Ankle DF knee extended: 8 degrees  Right foot: Ankle DF knee flexed: 8 degrees    Left foot: 1st MTPJ: Loaded: 45 Unloaded: 45  Left foot: 1st Ray: 4mm  Left foot: Ankle DF knee extended: 8 degrees  Left foot: Ankle DF knee flexed: 8 degrees    Gait Analysis: No shoulder drop, no hip drop. Rectus heel. Too many toes sign. Imaging: None    Assessment   Jane Stinson is a 51 y.o. male with     Diagnosis Orders   1.  Uncontrolled type 2 diabetes mellitus with hyperglycemia ([facility] 2. Hyperhidrosis of feet          Plan   · Patient examined and evaluated  · Diagnosis and treatment options discussed in detail  · Rx for Dry-sol antiperspirant   · Rx for topical econazole  · Instructed patient to apply dry-sol to feet, change socks multiple times daily, and spray shoes with Lysol  · Note written for patient to wear extra wide shoes while at work. · Patient to RTC in 3 month(s)  · Please, call the office with any questions or concerns     No orders of the defined types were placed in this encounter. No orders of the defined types were placed in this encounter.       Iesha Madera DPM   Podiatric Medicine & Surgery   4/15/2019 at 1:26 PM

## 2019-06-13 PROBLEM — R81 GLUCOSURIA: Status: ACTIVE | Noted: 2019-06-13

## 2019-06-13 PROBLEM — E78.5 DYSLIPIDEMIA: Status: ACTIVE | Noted: 2019-06-13

## 2019-06-14 PROBLEM — B37.42 CANDIDAL BALANITIS: Status: ACTIVE | Noted: 2019-06-14

## 2019-07-12 PROBLEM — Z91.199 NON-COMPLIANT PATIENT: Status: RESOLVED | Noted: 2018-02-09 | Resolved: 2019-07-12

## 2019-08-12 PROBLEM — H66.90 ACUTE OTITIS MEDIA: Status: ACTIVE | Noted: 2019-08-12

## 2019-08-19 ENCOUNTER — OFFICE VISIT (OUTPATIENT)
Dept: PODIATRY | Age: 49
End: 2019-08-19
Payer: OTHER GOVERNMENT

## 2019-08-19 VITALS
DIASTOLIC BLOOD PRESSURE: 78 MMHG | HEART RATE: 75 BPM | WEIGHT: 296 LBS | HEIGHT: 74 IN | SYSTOLIC BLOOD PRESSURE: 134 MMHG | BODY MASS INDEX: 37.99 KG/M2

## 2019-08-19 DIAGNOSIS — L60.0 OC (ONYCHOCRYPTOSIS): ICD-10-CM

## 2019-08-19 DIAGNOSIS — B35.3 TINEA PEDIS OF BOTH FEET: ICD-10-CM

## 2019-08-19 DIAGNOSIS — L74.513 HYPERHIDROSIS OF FEET: ICD-10-CM

## 2019-08-19 DIAGNOSIS — E11.65 UNCONTROLLED TYPE 2 DIABETES MELLITUS WITH HYPERGLYCEMIA (HCC): Primary | ICD-10-CM

## 2019-08-19 DIAGNOSIS — M72.2 PLANTAR FASCIITIS OF LEFT FOOT: ICD-10-CM

## 2019-08-19 PROCEDURE — 99213 OFFICE O/P EST LOW 20 MIN: CPT | Performed by: STUDENT IN AN ORGANIZED HEALTH CARE EDUCATION/TRAINING PROGRAM

## 2019-08-19 PROCEDURE — 99212 OFFICE O/P EST SF 10 MIN: CPT | Performed by: STUDENT IN AN ORGANIZED HEALTH CARE EDUCATION/TRAINING PROGRAM

## 2019-09-27 PROBLEM — S83.92XA SPRAIN OF LEFT KNEE: Status: ACTIVE | Noted: 2019-09-27

## 2019-09-27 PROBLEM — R19.7 DIARRHEA: Status: ACTIVE | Noted: 2019-09-27

## 2019-11-11 DIAGNOSIS — B35.3 TINEA PEDIS OF BOTH FEET: ICD-10-CM

## 2019-11-20 RX ORDER — ALUMINUM CHLORIDE 20 %
SOLUTION, NON-ORAL TOPICAL
Qty: 60 ML | Refills: 3 | OUTPATIENT
Start: 2019-11-20

## 2020-03-09 PROBLEM — L29.9 ITCHING: Status: RESOLVED | Noted: 2017-10-16 | Resolved: 2020-03-09

## 2020-04-18 PROBLEM — U07.1 COVID-19: Status: ACTIVE | Noted: 2020-04-18

## 2020-04-18 PROBLEM — R05.9 COUGH: Status: ACTIVE | Noted: 2020-04-18

## 2020-05-18 PROBLEM — R05.9 COUGH: Status: RESOLVED | Noted: 2020-04-18 | Resolved: 2020-05-18

## 2020-06-16 PROBLEM — M25.571 RIGHT ANKLE PAIN: Status: ACTIVE | Noted: 2020-06-16

## 2020-06-16 PROBLEM — D72.819 LEUKOPENIA: Status: ACTIVE | Noted: 2020-06-16

## 2020-06-16 PROBLEM — R79.82 CRP ELEVATED: Status: ACTIVE | Noted: 2020-06-16

## 2020-06-16 PROBLEM — M25.522 ELBOW PAIN, LEFT: Status: ACTIVE | Noted: 2020-06-16

## 2020-07-06 PROBLEM — L30.9 DERMATITIS: Status: ACTIVE | Noted: 2020-07-06

## 2020-08-12 ENCOUNTER — HOSPITAL ENCOUNTER (EMERGENCY)
Age: 50
Discharge: HOME | End: 2020-08-12
Attending: EMERGENCY MEDICINE
Payer: OTHER GOVERNMENT

## 2020-08-12 ENCOUNTER — APPOINTMENT (OUTPATIENT)
Dept: CT IMAGING | Age: 50
End: 2020-08-12
Payer: OTHER GOVERNMENT

## 2020-08-12 VITALS
OXYGEN SATURATION: 98 % | DIASTOLIC BLOOD PRESSURE: 78 MMHG | WEIGHT: 287 LBS | HEART RATE: 67 BPM | RESPIRATION RATE: 20 BRPM | BODY MASS INDEX: 36.83 KG/M2 | TEMPERATURE: 98.4 F | HEIGHT: 74 IN | SYSTOLIC BLOOD PRESSURE: 145 MMHG

## 2020-08-12 LAB
ABSOLUTE EOS #: 0.12 K/UL (ref 0–0.44)
ABSOLUTE IMMATURE GRANULOCYTE: 0.03 K/UL (ref 0–0.3)
ABSOLUTE LYMPH #: 2.08 K/UL (ref 1.1–3.7)
ABSOLUTE MONO #: 0.37 K/UL (ref 0.1–1.2)
ALBUMIN SERPL-MCNC: 4.2 G/DL (ref 3.5–5.2)
ALBUMIN/GLOBULIN RATIO: ABNORMAL (ref 1–2.5)
ALP BLD-CCNC: 63 U/L (ref 40–129)
ALT SERPL-CCNC: 29 U/L (ref 5–41)
ANION GAP SERPL CALCULATED.3IONS-SCNC: 12 MMOL/L (ref 9–17)
AST SERPL-CCNC: 16 U/L
BASOPHILS # BLD: 1 % (ref 0–2)
BASOPHILS ABSOLUTE: 0.04 K/UL (ref 0–0.2)
BILIRUB SERPL-MCNC: 0.19 MG/DL (ref 0.3–1.2)
BILIRUBIN URINE: NEGATIVE
BUN BLDV-MCNC: 16 MG/DL (ref 6–20)
BUN/CREAT BLD: 17 (ref 9–20)
CALCIUM SERPL-MCNC: 9.5 MG/DL (ref 8.6–10.4)
CHLORIDE BLD-SCNC: 99 MMOL/L (ref 98–107)
CO2: 28 MMOL/L (ref 20–31)
COLOR: YELLOW
COMMENT UA: ABNORMAL
CREAT SERPL-MCNC: 0.95 MG/DL (ref 0.7–1.2)
DIFFERENTIAL TYPE: ABNORMAL
EOSINOPHILS RELATIVE PERCENT: 2 % (ref 1–4)
GFR AFRICAN AMERICAN: >60 ML/MIN
GFR NON-AFRICAN AMERICAN: >60 ML/MIN
GFR SERPL CREATININE-BSD FRML MDRD: ABNORMAL ML/MIN/{1.73_M2}
GFR SERPL CREATININE-BSD FRML MDRD: ABNORMAL ML/MIN/{1.73_M2}
GLUCOSE BLD-MCNC: 158 MG/DL (ref 70–99)
GLUCOSE URINE: ABNORMAL
HCT VFR BLD CALC: 43.4 % (ref 40.7–50.3)
HEMOGLOBIN: 13.9 G/DL (ref 13–17)
IMMATURE GRANULOCYTES: 1 %
KETONES, URINE: ABNORMAL
LEUKOCYTE ESTERASE, URINE: NEGATIVE
LYMPHOCYTES # BLD: 32 % (ref 24–43)
MCH RBC QN AUTO: 28.9 PG (ref 25.2–33.5)
MCHC RBC AUTO-ENTMCNC: 32 G/DL (ref 28.4–34.8)
MCV RBC AUTO: 90.2 FL (ref 82.6–102.9)
MONOCYTES # BLD: 6 % (ref 3–12)
NITRITE, URINE: NEGATIVE
NRBC AUTOMATED: 0 PER 100 WBC
PDW BLD-RTO: 12.8 % (ref 11.8–14.4)
PH UA: 6 (ref 5–8)
PLATELET # BLD: 226 K/UL (ref 138–453)
PLATELET ESTIMATE: ABNORMAL
PMV BLD AUTO: 10.5 FL (ref 8.1–13.5)
POTASSIUM SERPL-SCNC: 4.3 MMOL/L (ref 3.7–5.3)
PROTEIN UA: NEGATIVE
RBC # BLD: 4.81 M/UL (ref 4.21–5.77)
RBC # BLD: ABNORMAL 10*6/UL
SEG NEUTROPHILS: 58 % (ref 36–65)
SEGMENTED NEUTROPHILS ABSOLUTE COUNT: 3.95 K/UL (ref 1.5–8.1)
SODIUM BLD-SCNC: 139 MMOL/L (ref 135–144)
SPECIFIC GRAVITY UA: 1.03 (ref 1–1.03)
TOTAL PROTEIN: 6.8 G/DL (ref 6.4–8.3)
TURBIDITY: CLEAR
URINE HGB: NEGATIVE
UROBILINOGEN, URINE: NORMAL
WBC # BLD: 6.6 K/UL (ref 3.5–11.3)
WBC # BLD: ABNORMAL 10*3/UL

## 2020-08-12 PROCEDURE — 81003 URINALYSIS AUTO W/O SCOPE: CPT

## 2020-08-12 PROCEDURE — 80053 COMPREHEN METABOLIC PANEL: CPT

## 2020-08-12 PROCEDURE — 99284 EMERGENCY DEPT VISIT MOD MDM: CPT

## 2020-08-12 PROCEDURE — 85025 COMPLETE CBC W/AUTO DIFF WBC: CPT

## 2020-08-12 PROCEDURE — 74176 CT ABD & PELVIS W/O CONTRAST: CPT

## 2020-08-12 RX ORDER — 0.9 % SODIUM CHLORIDE 0.9 %
1000 INTRAVENOUS SOLUTION INTRAVENOUS ONCE
Status: DISCONTINUED | OUTPATIENT
Start: 2020-08-12 | End: 2020-08-12 | Stop reason: HOSPADM

## 2020-08-12 ASSESSMENT — PAIN SCALES - GENERAL: PAINLEVEL_OUTOF10: 10

## 2020-08-13 NOTE — ED PROVIDER NOTES
EMERGENCY DEPARTMENT ENCOUNTER    Pt Name: Yovani Chacon  MRN: [id number]  [place] 1970  Date of evaluation: 8/12/20  CHIEF COMPLAINT       Chief Complaint   Patient presents with    Flank Pain     left side     HISTORY OF PRESENT ILLNESS   Patient is a 52-year-old male with PMH of kidney stones who presents to the ED complaining of left flank pain. Pain started 6 days ago. He was put on Flomax and Toradol for symptoms. However, symptoms have not improved. Pain located left flank does not radiate, non-positional.  Pain is tolerable, 4/10, intermittent. No fevers, no hematuria. Also no shortness of breath, chest pain, vomiting, diarrhea. REVIEW OF SYSTEMS     Review of Systems   All other systems reviewed and are negative. PASTMEDICAL HISTORY     Past Medical History:   Diagnosis Date    Cutaneous skin tags 7/1/2016    Diabetes mellitus ([address]     Itching 10/16/2017    Itching 10/16/2017    Kidney stone 7/19/2012    Kidney stone     Non-compliant patient 2/9/2018    Sleep apnea     wears cpap at night     SURGICAL HISTORY       Past Surgical History:   Procedure Laterality Date    LITHOTRIPSY       CURRENT MEDICATIONS       Previous Medications    ASCORBIC ACID (SM CHEWABLE VITAMIN C) 500 MG CHEW    Take 1 tablet by mouth 3 times daily    BETAMETHASONE DIPROPIONATE (DIPROLENE) 0.05 % OINTMENT    Apply topically daily. CHOLECALCIFEROL (VITAMIN D3) 125 MCG (5000 UT) CAPS    Take 1 capsule by mouth Daily    CONTINUOUS BLOOD GLUC  (FREESTYLE KADEN 14 DAY READER) JESSICA    One unit    CONTINUOUS BLOOD GLUC SENSOR (FREESTYLE KADEN 14 DAY SENSOR) MISC    Apply one sensor every 14 days    DULAGLUTIDE (TRULICITY) 5.02 YL/3.9US SOPN    Inject 0.75 mg into the skin once a week    ECONAZOLE NITRATE 1 % CREAM    Apply topically daily.     KETOROLAC (TORADOL) 10 MG TABLET    Take 1 tablet by mouth every 6 hours as needed for Pain    LOSARTAN (COZAAR) 25 MG TABLET    Take 1 tablet by mouth daily METFORMIN (GLUCOPHAGE) 1000 MG TABLET    Take 1 tablet by mouth 2 times daily (with meals)    TAMSULOSIN (FLOMAX) 0.4 MG CAPSULE    Take 1 capsule by mouth daily    ZINC SULFATE (ORAZINC) 220 (50 ZN) MG CAPSULE    Take 4 capsules by mouth daily     ALLERGIES     has No Known Allergies. FAMILY HISTORY     He indicated that his mother is alive. He indicated that his father is alive. SOCIAL HISTORY       Social History     Tobacco Use    Smoking status: Former Smoker     Packs/day: 0.50     Last attempt to quit: 2002     Years since quittin.0    Smokeless tobacco: Never Used   Substance Use Topics    Alcohol use: No    Drug use: No     PHYSICAL EXAM     INITIAL VITALS: BP (!) 145/78   Pulse 67   Temp 98.4 °F (36.9 °C) (Oral)   Resp 20   Ht 6' 2\" (1.88 m)   Wt 287 lb (130.2 kg)   SpO2 98%   BMI 36.85 kg/m²    Physical Exam  HENT:      Head: Normocephalic. Right Ear: External ear normal.      Left Ear: External ear normal.      Nose: Nose normal.   Eyes:      Conjunctiva/sclera: Conjunctivae normal.   Cardiovascular:      Rate and Rhythm: Normal rate. Pulmonary:      Effort: Pulmonary effort is normal.   Abdominal:      General: Abdomen is flat. Skin:     General: Skin is dry. Neurological:      Mental Status: He is alert. Mental status is at baseline. Psychiatric:         Mood and Affect: Mood normal.         Behavior: Behavior normal.         MEDICAL DECISION MAKING:   The patient is hemodynamically stable, afebrile, nontoxic-appearing. Physical exam notable for left flank pain. Based on history and exam likely kidney stone. ED plan for basic labs, fluids, CT and pelvis, reassess.            DIAGNOSTIC RESULTS   EKG:All EKG's are interpreted by the Emergency Department Physician who either signs or Co-signs this chart in the absence of a cardiologist.        RADIOLOGY:All plain film, CT, MRI, and formal ultrasound images (except ED bedside ultrasound) are read by the radiologist, see reports below, unless otherwisenoted in MDM or here. CT ABDOMEN PELVIS WO CONTRAST Additional Contrast? None   Final Result   1. Nonobstructing 5 mm calculus, upper pole left kidney   2. Nonobstructing 7 mm calculus, lower pole right kidney   3. Scattered colonic diverticula, without evidence of diverticulitis   4. Diffuse hepatic steatosis   5. Small hiatal hernia. Several lymph nodes are seen adjacent to the hiatal   hernia and gastric fundus endoscopy is recommended to further evaluate the GE   junction, and exclude a mass, which would account for the adjacent adenopathy           LABS: All lab results were reviewed by myself, and all abnormals are listed below. Labs Reviewed   CBC WITH AUTO DIFFERENTIAL - Abnormal; Notable for the following components:       Result Value    Immature Granulocytes 1 (*)     All other components within normal limits   COMPREHENSIVE METABOLIC PANEL W/ REFLEX TO MG FOR LOW K - Abnormal; Notable for the following components:    Glucose 158 (*)     Total Bilirubin 0.19 (*)     All other components within normal limits   URINE RT REFLEX TO CULTURE       EMERGENCY DEPARTMENTCOURSE:   Patient did well in the ED. CT scan shows bilateral nonobstructing kidney stones and poles of kidney. Also small hernia with adjacent lymph nodes. I advised patient to follow-up with his doctor for possible GI consult. I also gave contact information for Dr. Paola Haines if flank pain persists. No further work-up indicated at this time. Nursing notes reviewed. At this time this is what I find, the patient appears well and does not appear sick or toxic. I gave my usual and customary discussion of the risks and benefits of discharge versus admission. I answered the patient's questions. I gave the patient strict return precautions. Patient expressed understanding of the discharge instructions. Dictated but not reviewed.       Vitals:    Vitals:    08/12/20 1927   BP: (!) 145/78 Pulse: 67   Resp: 20   Temp: 98.4 °F (36.9 °C)   TempSrc: Oral   SpO2: 98%   Weight: 287 lb (130.2 kg)   Height: 6' 2\" (1.88 m)       The patient was given the following medications while in the emergency department:  Orders Placed This Encounter   Medications    0.9 % sodium chloride bolus     CONSULTS:  None    FINAL IMPRESSION      1.  Left flank pain          DISPOSITION/PLAN   DISPOSITION Decision To Discharge 08/12/2020 09:25:02 PM      PATIENT REFERRED TO:  Milli Chavez MD  [address] [place] [address]  [address] [facility]  [address] [facility]  [phone]    In 2 days      Kalia Frausto MD  [address]  [phone]      If symptoms worsen    DISCHARGE MEDICATIONS:  New Prescriptions    No medications on file     Ju Ortiz MD  Attending Emergency Physician                    Marky Barreto MD  08/12/20 8054

## 2020-08-31 ENCOUNTER — HOSPITAL ENCOUNTER (OUTPATIENT)
Dept: ULTRASOUND IMAGING | Age: 50
Discharge: HOME | End: 2020-09-02
Payer: OTHER GOVERNMENT

## 2020-08-31 PROCEDURE — 76870 US EXAM SCROTUM: CPT

## 2022-03-04 ENCOUNTER — HOSPITAL ENCOUNTER (EMERGENCY)
Age: 52
Discharge: LEFT BEFORE DISCHARGE | End: 2022-03-04
Attending: EMERGENCY MEDICINE
Payer: OTHER GOVERNMENT

## 2022-03-04 ENCOUNTER — APPOINTMENT (OUTPATIENT)
Dept: GENERAL RADIOLOGY | Age: 52
End: 2022-03-04
Payer: OTHER GOVERNMENT

## 2022-03-04 VITALS
TEMPERATURE: 98.4 F | RESPIRATION RATE: 20 BRPM | HEIGHT: 74 IN | SYSTOLIC BLOOD PRESSURE: 135 MMHG | WEIGHT: 285 LBS | HEART RATE: 74 BPM | BODY MASS INDEX: 36.57 KG/M2 | OXYGEN SATURATION: 98 % | DIASTOLIC BLOOD PRESSURE: 75 MMHG

## 2022-03-04 DIAGNOSIS — R07.9 CHEST PAIN, UNSPECIFIED TYPE: Primary | ICD-10-CM

## 2022-03-04 LAB
ABSOLUTE EOS #: 0.12 K/UL (ref 0–0.44)
ABSOLUTE IMMATURE GRANULOCYTE: 0.05 K/UL (ref 0–0.3)
ABSOLUTE LYMPH #: 2.46 K/UL (ref 1.1–3.7)
ABSOLUTE MONO #: 0.39 K/UL (ref 0.1–1.2)
ALBUMIN SERPL-MCNC: 4.3 G/DL (ref 3.5–5.2)
ALP BLD-CCNC: 74 U/L (ref 40–129)
ALT SERPL-CCNC: 22 U/L (ref 5–41)
AMYLASE: 49 U/L (ref 28–100)
ANION GAP SERPL CALCULATED.3IONS-SCNC: 8 MMOL/L (ref 9–17)
AST SERPL-CCNC: 16 U/L
BASOPHILS # BLD: 1 % (ref 0–2)
BASOPHILS ABSOLUTE: 0.05 K/UL (ref 0–0.2)
BILIRUB SERPL-MCNC: 0.19 MG/DL (ref 0.3–1.2)
BUN BLDV-MCNC: 15 MG/DL (ref 6–20)
BUN/CREAT BLD: 16 (ref 9–20)
CALCIUM SERPL-MCNC: 9.4 MG/DL (ref 8.6–10.4)
CHLORIDE BLD-SCNC: 98 MMOL/L (ref 98–107)
CO2: 31 MMOL/L (ref 20–31)
CREAT SERPL-MCNC: 0.95 MG/DL (ref 0.7–1.2)
D-DIMER QUANTITATIVE: 0.34 MG/L FEU (ref 0–0.59)
EOSINOPHILS RELATIVE PERCENT: 2 % (ref 1–4)
GFR AFRICAN AMERICAN: >60 ML/MIN
GFR NON-AFRICAN AMERICAN: >60 ML/MIN
GFR SERPL CREATININE-BSD FRML MDRD: ABNORMAL ML/MIN/{1.73_M2}
GLUCOSE BLD-MCNC: 131 MG/DL (ref 70–99)
HCT VFR BLD CALC: 42.4 % (ref 40.7–50.3)
HEMOGLOBIN: 13.6 G/DL (ref 13–17)
IMMATURE GRANULOCYTES: 1 %
LIPASE: 29 U/L (ref 13–60)
LYMPHOCYTES # BLD: 34 % (ref 24–43)
MCH RBC QN AUTO: 28.4 PG (ref 25.2–33.5)
MCHC RBC AUTO-ENTMCNC: 32.1 G/DL (ref 28.4–34.8)
MCV RBC AUTO: 88.5 FL (ref 82.6–102.9)
MONOCYTES # BLD: 5 % (ref 3–12)
NRBC AUTOMATED: 0 PER 100 WBC
PDW BLD-RTO: 12.9 % (ref 11.8–14.4)
PLATELET # BLD: 264 K/UL (ref 138–453)
PMV BLD AUTO: 10.5 FL (ref 8.1–13.5)
POTASSIUM SERPL-SCNC: 4.2 MMOL/L (ref 3.7–5.3)
PRO-BNP: 30 PG/ML
RBC # BLD: 4.79 M/UL (ref 4.21–5.77)
REASON FOR REJECTION: NORMAL
SEG NEUTROPHILS: 57 % (ref 36–65)
SEGMENTED NEUTROPHILS ABSOLUTE COUNT: 4.14 K/UL (ref 1.5–8.1)
SODIUM BLD-SCNC: 137 MMOL/L (ref 135–144)
TOTAL PROTEIN: 6.9 G/DL (ref 6.4–8.3)
TROPONIN, HIGH SENSITIVITY: 11 NG/L (ref 0–22)
WBC # BLD: 7.2 K/UL (ref 3.5–11.3)
ZZ NTE CLEAN UP: ORDERED TEST: NORMAL
ZZ NTE WITH NAME CLEAN UP: SPECIMEN SOURCE: NORMAL

## 2022-03-04 PROCEDURE — 84484 ASSAY OF TROPONIN QUANT: CPT

## 2022-03-04 PROCEDURE — 99283 EMERGENCY DEPT VISIT LOW MDM: CPT

## 2022-03-04 PROCEDURE — 93005 ELECTROCARDIOGRAM TRACING: CPT | Performed by: EMERGENCY MEDICINE

## 2022-03-04 PROCEDURE — 71045 X-RAY EXAM CHEST 1 VIEW: CPT

## 2022-03-04 PROCEDURE — 83690 ASSAY OF LIPASE: CPT

## 2022-03-04 PROCEDURE — 83880 ASSAY OF NATRIURETIC PEPTIDE: CPT

## 2022-03-04 PROCEDURE — 82150 ASSAY OF AMYLASE: CPT

## 2022-03-04 PROCEDURE — 6370000000 HC RX 637 (ALT 250 FOR IP): Performed by: EMERGENCY MEDICINE

## 2022-03-04 PROCEDURE — 80053 COMPREHEN METABOLIC PANEL: CPT

## 2022-03-04 PROCEDURE — 85379 FIBRIN DEGRADATION QUANT: CPT

## 2022-03-04 PROCEDURE — 85025 COMPLETE CBC W/AUTO DIFF WBC: CPT

## 2022-03-04 RX ORDER — GLIMEPIRIDE 2 MG/1
2 TABLET ORAL
COMMUNITY

## 2022-03-04 RX ORDER — ASPIRIN 81 MG/1
324 TABLET, CHEWABLE ORAL ONCE
Status: COMPLETED | OUTPATIENT
Start: 2022-03-04 | End: 2022-03-04

## 2022-03-04 RX ADMIN — ASPIRIN 81 MG 324 MG: 81 TABLET ORAL at 18:49

## 2022-03-04 ASSESSMENT — ENCOUNTER SYMPTOMS
SHORTNESS OF BREATH: 0
EYE DISCHARGE: 0
FACIAL SWELLING: 0
ABDOMINAL DISTENTION: 0
BACK PAIN: 0
ABDOMINAL PAIN: 0
EYE PAIN: 0
CHEST TIGHTNESS: 1

## 2022-03-04 ASSESSMENT — PAIN SCALES - GENERAL: PAINLEVEL_OUTOF10: 0

## 2022-03-04 NOTE — ED PROVIDER NOTES
EMERGENCY DEPARTMENT ENCOUNTER    Pt Name: Lacy Terry  MRN: [id number]  [place] 1970  Date of evaluation: 3/4/22  CHIEF COMPLAINT       Chief Complaint   Patient presents with    Chest Pain     HISTORY OF PRESENT ILLNESS   HPI  Patient is a 62-year-old male with a history of  Hypertension and diabetes who presented to the emergency department secondary to chest discomfort. Patient complains of a 4-day history of chest pressure localized to the midsternal epigastric region nonradiating no associated nausea vomiting or diaphoresis. Patient is she thought symptoms were gas he states the side effect of Metformin is having gas but he also had a cramping sensation in his abdomen. He denies relation to food. Patient initially thought the discomfort was resolved but continued therefore presented to the emergency room for further evaluation treatment. Patient denied a previous history of MI. Stated he had a stress test several years ago was within normal limits. Does not have a cardiologist.  Denied tobacco use or hyperlipidemia. Positive family history of cardiac disease. Patient denied associated shortness of breath hemoptysis recent travel immobility. No family history of clotting disorder. Patient denied nausea, vomiting, fevers or chills  REVIEW OF SYSTEMS     Review of Systems   Constitutional: Negative for chills, diaphoresis and fever. HENT: Negative for congestion, ear pain and facial swelling. Eyes: Negative for pain, discharge and visual disturbance. Respiratory: Positive for chest tightness. Negative for shortness of breath. Cardiovascular: Positive for chest pain. Negative for palpitations. Gastrointestinal: Negative for abdominal distention and abdominal pain. Genitourinary: Negative for difficulty urinating and flank pain. Musculoskeletal: Negative for back pain. Skin: Negative for wound. Neurological: Negative for dizziness, light-headedness and headaches. PASTMEDICAL HISTORY     Past Medical History:   Diagnosis Date    Cutaneous skin tags 2016    Diabetes mellitus ([address]     Itching 10/16/2017    Itching 10/16/2017    Kidney stone 2012    Kidney stone     Non-compliant patient 2018    Sleep apnea     wears cpap at night     SURGICAL HISTORY       Past Surgical History:   Procedure Laterality Date    LITHOTRIPSY       CURRENT MEDICATIONS       Previous Medications    ASCORBIC ACID (SM CHEWABLE VITAMIN C) 500 MG CHEW    Take 1 tablet by mouth 3 times daily    BETAMETHASONE DIPROPIONATE (DIPROLENE) 0.05 % OINTMENT    Apply topically daily. CHOLECALCIFEROL (VITAMIN D3) 125 MCG (5000 UT) CAPS    Take 1 capsule by mouth Daily    CONTINUOUS BLOOD GLUC  (FREESTYLE KADEN 14 DAY READER) JESSICA    One unit    CONTINUOUS BLOOD GLUC SENSOR (FREESTYLE KADEN 14 DAY SENSOR) MISC    Apply one sensor every 14 days    DULAGLUTIDE (TRULICITY) 8.95 MI/7.0AK SOPN    Inject 0.75 mg into the skin once a week    ECONAZOLE NITRATE 1 % CREAM    Apply topically daily. GLIMEPIRIDE (AMARYL) 2 MG TABLET    Take 2 mg by mouth every morning (before breakfast)    KETOROLAC (TORADOL) 10 MG TABLET    Take 1 tablet by mouth every 6 hours as needed for Pain    LOSARTAN (COZAAR) 25 MG TABLET    Take 1 tablet by mouth daily    METFORMIN (GLUCOPHAGE) 1000 MG TABLET    Take 1 tablet by mouth 2 times daily (with meals)    TAMSULOSIN (FLOMAX) 0.4 MG CAPSULE    Take 1 capsule by mouth daily    ZINC SULFATE (ORAZINC) 220 (50 ZN) MG CAPSULE    Take 4 capsules by mouth daily     ALLERGIES     has No Known Allergies. FAMILY HISTORY     He indicated that his mother is alive. He indicated that his father is alive.      SOCIAL HISTORY       Social History     Tobacco Use    Smoking status: Former Smoker     Packs/day: 0.50     Quit date: 2002     Years since quittin.6    Smokeless tobacco: Never Used   Vaping Use    Vaping Use: Never used   Substance Use Topics    Alcohol use: No    Drug use: No     PHYSICAL EXAM     INITIAL VITALS: /75   Pulse 74   Temp 98.4 °F (36.9 °C)   Resp 20   Ht 6' 2\" (1.88 m)   Wt 285 lb (129.3 kg)   SpO2 98%   BMI 36.59 kg/m²    Physical Exam  Vitals and nursing note reviewed. Constitutional:       General: He is not in acute distress. Appearance: He is well-developed. He is not diaphoretic. HENT:      Head: Normocephalic and atraumatic. Eyes:      Pupils: Pupils are equal, round, and reactive to light. Cardiovascular:      Rate and Rhythm: Normal rate and regular rhythm. Pulmonary:      Effort: Pulmonary effort is normal.      Breath sounds: Normal breath sounds. Abdominal:      General: Bowel sounds are normal.      Palpations: Abdomen is soft. Musculoskeletal:         General: Normal range of motion. Cervical back: Normal range of motion and neck supple. Skin:     General: Skin is warm. Capillary Refill: Capillary refill takes less than 2 seconds. Neurological:      Mental Status: He is alert and oriented to person, place, and time. MEDICAL DECISION MAKING:   The patient is a 46-year-old male with a history of diabetes who presented to the emergency department secondary to chest pressure/discomfort. EKG obtained on arrival sinus rhythm rate of 81 with occasional premature ventricular complexes as well as a right bundle branch block. Electronic medical reviewed no previous EKG available for review last EKG in the system was 4/30/2015. Patient received aspirin orders for labs including a chest x-ray. Patient will be reevaluated  8:15 PM  Chest x-ray no infiltrate dissection pneumothorax laboratory analysis including troponin and D-dimer within normal limits. Given patient's history of diabetes and hypertension discussed with patient admission for cycling cardiac enzymes and cardiac consultation however he declined admission.   Risks of leaving AGAINST MEDICAL ADVICE such as death disability tablet 324 mg     CONSULTS:  None    FINAL IMPRESSION      1. Chest pain, unspecified type          DISPOSITION/PLAN   DISPOSITION [place] 03/04/2022 08:15:52 PM      PATIENT REFERRED TO:  Marcy Dorado MD  [place] [id number]  [phone]          DISCHARGE MEDICATIONS:  New Prescriptions    No medications on file     Vega Chao MD  Attending Emergency Physician      The care is provided during an unprecedented national emergency due to the novel coronavirus, COVID 19. This note was created with the assistance of a speech-recognition program. While intending to generate a document that actually reflects the content of the visit, no guarantees can be provided that every mistake has been identified and corrected by editing.     Renee Turner MD  95/04/97 2016

## 2022-03-05 NOTE — ED NOTES
Pt does not wish to stay and signed out AMA. Paperwork on chart. Dr. Carmel Zaragoza in room to have pt sign AMA paperwork. Pt advised if symptoms continue or get worse he should come back to ER. Pt alert and oriented accompanied by significant other.             Vinayak Nevarez RN  03/04/22 2019

## 2022-03-05 NOTE — ED NOTES
Pt presents to the ER for midsternal episgastric chest pain. Pt states it has been going on for about 4 days ago getting worse. Pt states he had a stress test several years ago that was negative.  Pt does not have a cardiologist.          Angelo Gan RN  03/04/22 2018

## 2022-03-07 LAB
EKG ATRIAL RATE: 81 BPM
EKG P AXIS: 16 DEGREES
EKG P-R INTERVAL: 156 MS
EKG Q-T INTERVAL: 396 MS
EKG QRS DURATION: 148 MS
EKG QTC CALCULATION (BAZETT): 460 MS
EKG R AXIS: -40 DEGREES
EKG T AXIS: 26 DEGREES
EKG VENTRICULAR RATE: 81 BPM

## 2023-02-28 ENCOUNTER — NURSE TRIAGE (OUTPATIENT)
Dept: OTHER | Facility: CLINIC | Age: 53
End: 2023-02-28

## 2023-02-28 ENCOUNTER — TELEPHONE (OUTPATIENT)
Dept: FAMILY MEDICINE CLINIC | Age: 53
End: 2023-02-28

## 2023-02-28 NOTE — TELEPHONE ENCOUNTER
----- Message from Kemi Rey sent at 2/28/2023  9:08 AM EST -----  Subject: Appointment Request    Reason for Call: New Patient/New to Provider Appointment needed:   Semi-Routine Skin Problem    QUESTIONS    Reason for appointment request? No appointments available during search     Additional Information for Provider? Patient would like to establish care   with Dr. Jazlyn Kothari. He has diabetes and sleep apnea, and is currently   dealing with cystic, painful lumps on his face in his beard area, though   they are improving.  Please contact him to schedule or advise.  ---------------------------------------------------------------------------  --------------  Darren LIU  [phone]; OK to leave message on voicemail,OK to respond with electronic   message via [email or web address] portal (only for patients who have registered [email or web address]   account)  ---------------------------------------------------------------------------  --------------  SCRIPT ANSWERS  COVID Screen: Clau Perez

## 2023-02-28 NOTE — TELEPHONE ENCOUNTER
Location of patient: [place]    Received call from [facility]; Patient with [employer] Complaint requesting to establish care with [facility]. Subjective: Caller states \"I have been off for 4 days now. I have sleep apnea and diabetes. I had a PCP but the wait list were crazy. I saw a [place] doctor and I didn't like her either. I need FMLA for sleep apnea and my diabetes. I am tired from my sleep apnea. I also have acne that I want to be seen for. I need to be seen for this stuff. \"     No triage needed.  He is wanting FMLA for chronic issue

## 2024-12-28 ENCOUNTER — HOSPITAL ENCOUNTER (INPATIENT)
Age: 54
LOS: 3 days | Discharge: HOME | DRG: 193 | End: 2024-12-31
Attending: EMERGENCY MEDICINE | Admitting: FAMILY MEDICINE
Payer: OTHER GOVERNMENT

## 2024-12-28 ENCOUNTER — APPOINTMENT (OUTPATIENT)
Dept: GENERAL RADIOLOGY | Age: 54
DRG: 193 | End: 2024-12-28
Payer: OTHER GOVERNMENT

## 2024-12-28 ENCOUNTER — APPOINTMENT (OUTPATIENT)
Dept: CT IMAGING | Age: 54
DRG: 193 | End: 2024-12-28
Payer: OTHER GOVERNMENT

## 2024-12-28 DIAGNOSIS — J15.9 COMMUNITY ACQUIRED BACTERIAL PNEUMONIA: Primary | ICD-10-CM

## 2024-12-28 DIAGNOSIS — R09.02 HYPOXIA: ICD-10-CM

## 2024-12-28 PROBLEM — J09.X1 COMMUNITY ACQUIRED PNEUMONIA DUE TO INFLUENZA A VIRUS: Status: ACTIVE | Noted: 2024-12-28

## 2024-12-28 LAB
ANION GAP SERPL CALCULATED.3IONS-SCNC: 18 MMOL/L (ref 9–16)
BASOPHILS # BLD: 0.05 K/UL (ref 0–0.2)
BASOPHILS NFR BLD: 0 % (ref 0–2)
BUN SERPL-MCNC: 15 MG/DL (ref 6–20)
CALCIUM SERPL-MCNC: 9.9 MG/DL (ref 8.6–10.4)
CHLORIDE SERPL-SCNC: 94 MMOL/L (ref 98–107)
CO2 SERPL-SCNC: 23 MMOL/L (ref 20–31)
CREAT SERPL-MCNC: 1 MG/DL (ref 0.7–1.2)
EKG ATRIAL RATE: 96 BPM
EKG P AXIS: 41 DEGREES
EKG P-R INTERVAL: 154 MS
EKG Q-T INTERVAL: 364 MS
EKG QRS DURATION: 150 MS
EKG QTC CALCULATION (BAZETT): 459 MS
EKG R AXIS: -59 DEGREES
EKG T AXIS: 19 DEGREES
EKG VENTRICULAR RATE: 96 BPM
EOSINOPHIL # BLD: <0.03 K/UL (ref 0–0.44)
EOSINOPHILS RELATIVE PERCENT: 0 % (ref 1–4)
ERYTHROCYTE [DISTWIDTH] IN BLOOD BY AUTOMATED COUNT: 12.3 % (ref 11.8–14.4)
FLUAV RNA RESP QL NAA+PROBE: DETECTED
FLUBV RNA RESP QL NAA+PROBE: NOT DETECTED
GFR, ESTIMATED: >90 ML/MIN/1.73M2
GLUCOSE BLD-MCNC: 310 MG/DL (ref 75–110)
GLUCOSE BLD-MCNC: 349 MG/DL (ref 75–110)
GLUCOSE BLD-MCNC: 371 MG/DL (ref 75–110)
GLUCOSE BLD-MCNC: 407 MG/DL (ref 75–110)
GLUCOSE SERPL-MCNC: 286 MG/DL (ref 74–99)
HCT VFR BLD AUTO: 44.9 % (ref 40.7–50.3)
HGB BLD-MCNC: 15.1 G/DL (ref 13–17)
IMM GRANULOCYTES # BLD AUTO: 0.08 K/UL (ref 0–0.3)
IMM GRANULOCYTES NFR BLD: 1 %
LACTATE BLDV-SCNC: 1.7 MMOL/L (ref 0.5–2.2)
LYMPHOCYTES NFR BLD: 1.69 K/UL (ref 1.1–3.7)
LYMPHOCYTES RELATIVE PERCENT: 12 % (ref 24–43)
MCH RBC QN AUTO: 29.8 PG (ref 25.2–33.5)
MCHC RBC AUTO-ENTMCNC: 33.6 G/DL (ref 28.4–34.8)
MCV RBC AUTO: 88.6 FL (ref 82.6–102.9)
MONOCYTES NFR BLD: 0.58 K/UL (ref 0.1–1.2)
MONOCYTES NFR BLD: 4 % (ref 3–12)
NEUTROPHILS NFR BLD: 83 % (ref 36–65)
NEUTS SEG NFR BLD: 12.01 K/UL (ref 1.5–8.1)
NRBC BLD-RTO: 0 PER 100 WBC
PLATELET # BLD AUTO: 244 K/UL (ref 138–453)
PMV BLD AUTO: 10.7 FL (ref 8.1–13.5)
POTASSIUM SERPL-SCNC: 4 MMOL/L (ref 3.7–5.3)
PROCALCITONIN SERPL-MCNC: 0.41 NG/ML (ref 0–0.09)
RBC # BLD AUTO: 5.07 M/UL (ref 4.21–5.77)
SARS-COV-2 RNA RESP QL NAA+PROBE: NOT DETECTED
SODIUM SERPL-SCNC: 135 MMOL/L (ref 136–145)
SOURCE: ABNORMAL
SPECIMEN DESCRIPTION: ABNORMAL
TROPONIN I SERPL HS-MCNC: 13 NG/L (ref 0–22)
WBC OTHER # BLD: 14.4 K/UL (ref 3.5–11.3)

## 2024-12-28 PROCEDURE — 87641 MR-STAPH DNA AMP PROBE: CPT

## 2024-12-28 PROCEDURE — 6370000000 HC RX 637 (ALT 250 FOR IP): Performed by: NURSE PRACTITIONER

## 2024-12-28 PROCEDURE — 6360000004 HC RX CONTRAST MEDICATION: Performed by: EMERGENCY MEDICINE

## 2024-12-28 PROCEDURE — 87040 BLOOD CULTURE FOR BACTERIA: CPT

## 2024-12-28 PROCEDURE — 87899 AGENT NOS ASSAY W/OPTIC: CPT

## 2024-12-28 PROCEDURE — 93005 ELECTROCARDIOGRAM TRACING: CPT | Performed by: EMERGENCY MEDICINE

## 2024-12-28 PROCEDURE — 96365 THER/PROPH/DIAG IV INF INIT: CPT

## 2024-12-28 PROCEDURE — 94660 CPAP INITIATION&MGMT: CPT

## 2024-12-28 PROCEDURE — 71045 X-RAY EXAM CHEST 1 VIEW: CPT

## 2024-12-28 PROCEDURE — 36415 COLL VENOUS BLD VENIPUNCTURE: CPT

## 2024-12-28 PROCEDURE — 2580000003 HC RX 258: Performed by: NURSE PRACTITIONER

## 2024-12-28 PROCEDURE — 6360000002 HC RX W HCPCS: Performed by: NURSE PRACTITIONER

## 2024-12-28 PROCEDURE — 83605 ASSAY OF LACTIC ACID: CPT

## 2024-12-28 PROCEDURE — 2500000003 HC RX 250 WO HCPCS: Performed by: EMERGENCY MEDICINE

## 2024-12-28 PROCEDURE — 99285 EMERGENCY DEPT VISIT HI MDM: CPT

## 2024-12-28 PROCEDURE — 6360000002 HC RX W HCPCS: Performed by: FAMILY MEDICINE

## 2024-12-28 PROCEDURE — 85025 COMPLETE CBC W/AUTO DIFF WBC: CPT

## 2024-12-28 PROCEDURE — 6360000002 HC RX W HCPCS: Performed by: EMERGENCY MEDICINE

## 2024-12-28 PROCEDURE — 2500000003 HC RX 250 WO HCPCS: Performed by: INTERNAL MEDICINE

## 2024-12-28 PROCEDURE — 94640 AIRWAY INHALATION TREATMENT: CPT

## 2024-12-28 PROCEDURE — 84145 PROCALCITONIN (PCT): CPT

## 2024-12-28 PROCEDURE — 82947 ASSAY GLUCOSE BLOOD QUANT: CPT

## 2024-12-28 PROCEDURE — 96375 TX/PRO/DX INJ NEW DRUG ADDON: CPT

## 2024-12-28 PROCEDURE — 84484 ASSAY OF TROPONIN QUANT: CPT

## 2024-12-28 PROCEDURE — 6370000000 HC RX 637 (ALT 250 FOR IP): Performed by: FAMILY MEDICINE

## 2024-12-28 PROCEDURE — 87449 NOS EACH ORGANISM AG IA: CPT

## 2024-12-28 PROCEDURE — 6360000002 HC RX W HCPCS: Performed by: INTERNAL MEDICINE

## 2024-12-28 PROCEDURE — 2500000003 HC RX 250 WO HCPCS: Performed by: NURSE PRACTITIONER

## 2024-12-28 PROCEDURE — 5A09357 ASSISTANCE WITH RESPIRATORY VENTILATION, LESS THAN 24 CONSECUTIVE HOURS, CONTINUOUS POSITIVE AIRWAY PRESSURE: ICD-10-PCS | Performed by: FAMILY MEDICINE

## 2024-12-28 PROCEDURE — 2580000003 HC RX 258: Performed by: EMERGENCY MEDICINE

## 2024-12-28 PROCEDURE — 6370000000 HC RX 637 (ALT 250 FOR IP): Performed by: EMERGENCY MEDICINE

## 2024-12-28 PROCEDURE — 71260 CT THORAX DX C+: CPT

## 2024-12-28 PROCEDURE — 2700000000 HC OXYGEN THERAPY PER DAY

## 2024-12-28 PROCEDURE — 80048 BASIC METABOLIC PNL TOTAL CA: CPT

## 2024-12-28 PROCEDURE — 94761 N-INVAS EAR/PLS OXIMETRY MLT: CPT

## 2024-12-28 PROCEDURE — 83036 HEMOGLOBIN GLYCOSYLATED A1C: CPT

## 2024-12-28 PROCEDURE — 1200000000 HC SEMI PRIVATE

## 2024-12-28 PROCEDURE — 87636 SARSCOV2 & INF A&B AMP PRB: CPT

## 2024-12-28 RX ORDER — IOPAMIDOL 755 MG/ML
75 INJECTION, SOLUTION INTRAVASCULAR
Status: COMPLETED | OUTPATIENT
Start: 2024-12-28 | End: 2024-12-28

## 2024-12-28 RX ORDER — ALBUTEROL SULFATE 0.83 MG/ML
2.5 SOLUTION RESPIRATORY (INHALATION) ONCE
Status: COMPLETED | OUTPATIENT
Start: 2024-12-28 | End: 2024-12-28

## 2024-12-28 RX ORDER — ACETAMINOPHEN 325 MG/1
650 TABLET ORAL EVERY 4 HOURS PRN
Status: DISCONTINUED | OUTPATIENT
Start: 2024-12-28 | End: 2024-12-31 | Stop reason: HOSPADM

## 2024-12-28 RX ORDER — ENOXAPARIN SODIUM 100 MG/ML
30 INJECTION SUBCUTANEOUS 2 TIMES DAILY
Status: DISCONTINUED | OUTPATIENT
Start: 2024-12-28 | End: 2024-12-31 | Stop reason: HOSPADM

## 2024-12-28 RX ORDER — DOXYCYCLINE 100 MG/1
100 CAPSULE ORAL EVERY 12 HOURS SCHEDULED
Status: DISCONTINUED | OUTPATIENT
Start: 2024-12-28 | End: 2024-12-30

## 2024-12-28 RX ORDER — ALBUTEROL SULFATE 0.83 MG/ML
2.5 SOLUTION RESPIRATORY (INHALATION) EVERY 4 HOURS PRN
Status: DISCONTINUED | OUTPATIENT
Start: 2024-12-28 | End: 2024-12-31 | Stop reason: HOSPADM

## 2024-12-28 RX ORDER — INSULIN LISPRO 100 [IU]/ML
5 INJECTION, SOLUTION INTRAVENOUS; SUBCUTANEOUS ONCE
Status: COMPLETED | OUTPATIENT
Start: 2024-12-28 | End: 2024-12-28

## 2024-12-28 RX ORDER — SODIUM CHLORIDE 0.9 % (FLUSH) 0.9 %
10 SYRINGE (ML) INJECTION EVERY 12 HOURS SCHEDULED
Status: DISCONTINUED | OUTPATIENT
Start: 2024-12-28 | End: 2024-12-31 | Stop reason: HOSPADM

## 2024-12-28 RX ORDER — OSELTAMIVIR PHOSPHATE 75 MG/1
75 CAPSULE ORAL 2 TIMES DAILY
Status: DISCONTINUED | OUTPATIENT
Start: 2024-12-28 | End: 2024-12-31 | Stop reason: HOSPADM

## 2024-12-28 RX ORDER — ACETAMINOPHEN 650 MG/1
650 SUPPOSITORY RECTAL EVERY 6 HOURS PRN
Status: DISCONTINUED | OUTPATIENT
Start: 2024-12-28 | End: 2024-12-31 | Stop reason: HOSPADM

## 2024-12-28 RX ORDER — ONDANSETRON 4 MG/1
4 TABLET, ORALLY DISINTEGRATING ORAL EVERY 8 HOURS PRN
Status: DISCONTINUED | OUTPATIENT
Start: 2024-12-28 | End: 2024-12-31 | Stop reason: HOSPADM

## 2024-12-28 RX ORDER — MORPHINE SULFATE 2 MG/ML
2 INJECTION, SOLUTION INTRAMUSCULAR; INTRAVENOUS ONCE
Status: COMPLETED | OUTPATIENT
Start: 2024-12-28 | End: 2024-12-28

## 2024-12-28 RX ORDER — ENOXAPARIN SODIUM 100 MG/ML
30 INJECTION SUBCUTANEOUS 2 TIMES DAILY
Status: DISCONTINUED | OUTPATIENT
Start: 2024-12-28 | End: 2024-12-28 | Stop reason: SDUPTHER

## 2024-12-28 RX ORDER — DEXTROSE MONOHYDRATE 100 MG/ML
INJECTION, SOLUTION INTRAVENOUS CONTINUOUS PRN
Status: DISCONTINUED | OUTPATIENT
Start: 2024-12-28 | End: 2024-12-31 | Stop reason: HOSPADM

## 2024-12-28 RX ORDER — SODIUM CHLORIDE 0.9 % (FLUSH) 0.9 %
10 SYRINGE (ML) INJECTION PRN
Status: DISCONTINUED | OUTPATIENT
Start: 2024-12-28 | End: 2024-12-31 | Stop reason: HOSPADM

## 2024-12-28 RX ORDER — SODIUM CHLORIDE 9 MG/ML
INJECTION, SOLUTION INTRAVENOUS PRN
Status: DISCONTINUED | OUTPATIENT
Start: 2024-12-28 | End: 2024-12-31 | Stop reason: HOSPADM

## 2024-12-28 RX ORDER — ONDANSETRON 2 MG/ML
4 INJECTION INTRAMUSCULAR; INTRAVENOUS EVERY 6 HOURS PRN
Status: DISCONTINUED | OUTPATIENT
Start: 2024-12-28 | End: 2024-12-31 | Stop reason: HOSPADM

## 2024-12-28 RX ORDER — DEXTROMETHORPHAN POLISTIREX 30 MG/5ML
60 SUSPENSION ORAL EVERY 12 HOURS SCHEDULED
Status: DISCONTINUED | OUTPATIENT
Start: 2024-12-28 | End: 2024-12-31 | Stop reason: HOSPADM

## 2024-12-28 RX ORDER — 0.9 % SODIUM CHLORIDE 0.9 %
80 INTRAVENOUS SOLUTION INTRAVENOUS ONCE
Status: COMPLETED | OUTPATIENT
Start: 2024-12-28 | End: 2024-12-28

## 2024-12-28 RX ORDER — IPRATROPIUM BROMIDE AND ALBUTEROL SULFATE 2.5; .5 MG/3ML; MG/3ML
1 SOLUTION RESPIRATORY (INHALATION)
Status: DISCONTINUED | OUTPATIENT
Start: 2024-12-28 | End: 2024-12-29

## 2024-12-28 RX ORDER — POTASSIUM CHLORIDE 1500 MG/1
40 TABLET, EXTENDED RELEASE ORAL PRN
Status: DISCONTINUED | OUTPATIENT
Start: 2024-12-28 | End: 2024-12-31 | Stop reason: HOSPADM

## 2024-12-28 RX ORDER — MAGNESIUM SULFATE IN WATER 40 MG/ML
2000 INJECTION, SOLUTION INTRAVENOUS PRN
Status: DISCONTINUED | OUTPATIENT
Start: 2024-12-28 | End: 2024-12-31 | Stop reason: HOSPADM

## 2024-12-28 RX ORDER — INSULIN LISPRO 100 [IU]/ML
4 INJECTION, SOLUTION INTRAVENOUS; SUBCUTANEOUS ONCE
Status: COMPLETED | OUTPATIENT
Start: 2024-12-28 | End: 2024-12-28

## 2024-12-28 RX ORDER — POTASSIUM CHLORIDE 7.45 MG/ML
10 INJECTION INTRAVENOUS PRN
Status: DISCONTINUED | OUTPATIENT
Start: 2024-12-28 | End: 2024-12-31 | Stop reason: HOSPADM

## 2024-12-28 RX ORDER — ONDANSETRON 4 MG/1
4 TABLET, ORALLY DISINTEGRATING ORAL EVERY 8 HOURS PRN
Status: DISCONTINUED | OUTPATIENT
Start: 2024-12-28 | End: 2024-12-28

## 2024-12-28 RX ORDER — INSULIN LISPRO 100 [IU]/ML
0-4 INJECTION, SOLUTION INTRAVENOUS; SUBCUTANEOUS
Status: DISCONTINUED | OUTPATIENT
Start: 2024-12-28 | End: 2024-12-29

## 2024-12-28 RX ORDER — ACETAMINOPHEN 325 MG/1
650 TABLET ORAL EVERY 6 HOURS PRN
Status: DISCONTINUED | OUTPATIENT
Start: 2024-12-28 | End: 2024-12-31 | Stop reason: HOSPADM

## 2024-12-28 RX ORDER — BENZONATATE 100 MG/1
100 CAPSULE ORAL ONCE
Status: COMPLETED | OUTPATIENT
Start: 2024-12-28 | End: 2024-12-28

## 2024-12-28 RX ORDER — METOCLOPRAMIDE HYDROCHLORIDE 5 MG/ML
10 INJECTION INTRAMUSCULAR; INTRAVENOUS ONCE
Status: COMPLETED | OUTPATIENT
Start: 2024-12-28 | End: 2024-12-28

## 2024-12-28 RX ORDER — ACETAMINOPHEN 325 MG/1
650 TABLET ORAL ONCE
Status: COMPLETED | OUTPATIENT
Start: 2024-12-28 | End: 2024-12-28

## 2024-12-28 RX ORDER — 0.9 % SODIUM CHLORIDE 0.9 %
1000 INTRAVENOUS SOLUTION INTRAVENOUS ONCE
Status: COMPLETED | OUTPATIENT
Start: 2024-12-28 | End: 2024-12-28

## 2024-12-28 RX ORDER — SODIUM CHLORIDE 0.9 % (FLUSH) 0.9 %
5-40 SYRINGE (ML) INJECTION EVERY 12 HOURS SCHEDULED
Status: DISCONTINUED | OUTPATIENT
Start: 2024-12-28 | End: 2024-12-31 | Stop reason: HOSPADM

## 2024-12-28 RX ORDER — SODIUM CHLORIDE 0.9 % (FLUSH) 0.9 %
5-40 SYRINGE (ML) INJECTION PRN
Status: DISCONTINUED | OUTPATIENT
Start: 2024-12-28 | End: 2024-12-31 | Stop reason: HOSPADM

## 2024-12-28 RX ADMIN — DOXYCYCLINE 100 MG: 100 CAPSULE ORAL at 21:21

## 2024-12-28 RX ADMIN — MORPHINE SULFATE 2 MG: 2 INJECTION, SOLUTION INTRAMUSCULAR; INTRAVENOUS at 06:02

## 2024-12-28 RX ADMIN — AZITHROMYCIN MONOHYDRATE 500 MG: 500 INJECTION, POWDER, LYOPHILIZED, FOR SOLUTION INTRAVENOUS at 06:07

## 2024-12-28 RX ADMIN — DOXYCYCLINE 100 MG: 100 CAPSULE ORAL at 12:00

## 2024-12-28 RX ADMIN — ALBUTEROL SULFATE 2.5 MG: 2.5 SOLUTION RESPIRATORY (INHALATION) at 02:21

## 2024-12-28 RX ADMIN — IPRATROPIUM BROMIDE AND ALBUTEROL SULFATE 1 DOSE: 2.5; .5 SOLUTION RESPIRATORY (INHALATION) at 12:00

## 2024-12-28 RX ADMIN — ONDANSETRON 4 MG: 4 TABLET, ORALLY DISINTEGRATING ORAL at 02:57

## 2024-12-28 RX ADMIN — INSULIN LISPRO 4 UNITS: 100 INJECTION, SOLUTION INTRAVENOUS; SUBCUTANEOUS at 18:05

## 2024-12-28 RX ADMIN — OSELTAMIVIR PHOSPHATE 75 MG: 75 CAPSULE ORAL at 21:21

## 2024-12-28 RX ADMIN — CEFEPIME 2000 MG: 2 INJECTION, POWDER, FOR SOLUTION INTRAVENOUS at 18:06

## 2024-12-28 RX ADMIN — ACETAMINOPHEN 650 MG: 325 TABLET ORAL at 15:26

## 2024-12-28 RX ADMIN — IPRATROPIUM BROMIDE AND ALBUTEROL SULFATE 1 DOSE: 2.5; .5 SOLUTION RESPIRATORY (INHALATION) at 20:17

## 2024-12-28 RX ADMIN — SODIUM CHLORIDE 80 ML: 0.9 INJECTION, SOLUTION INTRAVENOUS at 04:52

## 2024-12-28 RX ADMIN — CEFEPIME 2000 MG: 2 INJECTION, POWDER, FOR SOLUTION INTRAVENOUS at 12:02

## 2024-12-28 RX ADMIN — ENOXAPARIN SODIUM 30 MG: 100 INJECTION SUBCUTANEOUS at 15:07

## 2024-12-28 RX ADMIN — ACETAMINOPHEN 650 MG: 325 TABLET ORAL at 03:01

## 2024-12-28 RX ADMIN — IOPAMIDOL 75 ML: 755 INJECTION, SOLUTION INTRAVENOUS at 04:52

## 2024-12-28 RX ADMIN — WATER 40 MG: 1 INJECTION INTRAMUSCULAR; INTRAVENOUS; SUBCUTANEOUS at 15:07

## 2024-12-28 RX ADMIN — SODIUM CHLORIDE, PRESERVATIVE FREE 10 ML: 5 INJECTION INTRAVENOUS at 04:52

## 2024-12-28 RX ADMIN — SODIUM CHLORIDE, PRESERVATIVE FREE 10 ML: 5 INJECTION INTRAVENOUS at 21:21

## 2024-12-28 RX ADMIN — OSELTAMIVIR PHOSPHATE 75 MG: 75 CAPSULE ORAL at 09:30

## 2024-12-28 RX ADMIN — METOCLOPRAMIDE 10 MG: 5 INJECTION, SOLUTION INTRAMUSCULAR; INTRAVENOUS at 06:26

## 2024-12-28 RX ADMIN — INSULIN LISPRO 4 UNITS: 100 INJECTION, SOLUTION INTRAVENOUS; SUBCUTANEOUS at 21:21

## 2024-12-28 RX ADMIN — ENOXAPARIN SODIUM 30 MG: 100 INJECTION SUBCUTANEOUS at 21:20

## 2024-12-28 RX ADMIN — INSULIN LISPRO 5 UNITS: 100 INJECTION, SOLUTION INTRAVENOUS; SUBCUTANEOUS at 21:20

## 2024-12-28 RX ADMIN — BENZONATATE 100 MG: 100 CAPSULE ORAL at 02:05

## 2024-12-28 RX ADMIN — IPRATROPIUM BROMIDE AND ALBUTEROL SULFATE 1 DOSE: 2.5; .5 SOLUTION RESPIRATORY (INHALATION) at 15:35

## 2024-12-28 RX ADMIN — WATER 1000 MG: 1 INJECTION INTRAMUSCULAR; INTRAVENOUS; SUBCUTANEOUS at 06:04

## 2024-12-28 RX ADMIN — ACETAMINOPHEN 650 MG: 325 TABLET ORAL at 09:30

## 2024-12-28 RX ADMIN — INSULIN LISPRO 3 UNITS: 100 INJECTION, SOLUTION INTRAVENOUS; SUBCUTANEOUS at 13:48

## 2024-12-28 RX ADMIN — SODIUM CHLORIDE 1000 ML: 9 INJECTION, SOLUTION INTRAVENOUS at 06:21

## 2024-12-28 ASSESSMENT — PAIN DESCRIPTION - ORIENTATION
ORIENTATION: MID;INNER
ORIENTATION: LEFT

## 2024-12-28 ASSESSMENT — LIFESTYLE VARIABLES
HOW OFTEN DO YOU HAVE A DRINK CONTAINING ALCOHOL: MONTHLY OR LESS
HOW MANY STANDARD DRINKS CONTAINING ALCOHOL DO YOU HAVE ON A TYPICAL DAY: 1 OR 2

## 2024-12-28 ASSESSMENT — PAIN DESCRIPTION - LOCATION
LOCATION: CHEST
LOCATION: HEAD

## 2024-12-28 ASSESSMENT — PAIN SCALES - GENERAL
PAINLEVEL_OUTOF10: 4
PAINLEVEL_OUTOF10: 0
PAINLEVEL_OUTOF10: 0
PAINLEVEL_OUTOF10: 3

## 2024-12-28 ASSESSMENT — PAIN DESCRIPTION - PAIN TYPE: TYPE: ACUTE PAIN

## 2024-12-28 ASSESSMENT — PAIN DESCRIPTION - DESCRIPTORS: DESCRIPTORS: THROBBING

## 2024-12-28 ASSESSMENT — PAIN - FUNCTIONAL ASSESSMENT: PAIN_FUNCTIONAL_ASSESSMENT: ACTIVITIES ARE NOT PREVENTED

## 2024-12-28 NOTE — RT PROTOCOL NOTE
RT Inhaler-Nebulizer Bronchodilator Protocol Note    There is a bronchodilator order in the chart from a provider indicating to follow the RT Bronchodilator Protocol and there is an “Initiate RT Inhaler-Nebulizer Bronchodilator Protocol” order as well (see protocol at bottom of note).    CXR Findings:  XR CHEST PORTABLE    Result Date: 12/28/2024  No focal infiltrates.       The findings from the last RT Protocol Assessment were as follows:   History Pulmonary Disease: Chronic pulmonary disease  Respiratory Pattern: Mild dyspnea at rest, irregular pattern, or RR 21-25 bpm  Breath Sounds: Inspiratory and expiratory or bilateral wheezing and/or rhonchi  Cough: Strong, productive  Indication for Bronchodilator Therapy: Wheezing associated with pulm disorder  Bronchodilator Assessment Score: 13    Aerosolized bronchodilator medication orders have been revised according to the RT Inhaler-Nebulizer Bronchodilator Protocol below.    Respiratory Therapist to perform RT Therapy Protocol Assessment initially then follow the protocol.  Repeat RT Therapy Protocol Assessment PRN for score 0-3 or on second treatment, BID, and PRN for scores above 3.    No Indications - adjust the frequency to every 6 hours PRN wheezing or bronchospasm, if no treatments needed after 48 hours then discontinue using Per Protocol order mode.     If indication present, adjust the RT bronchodilator orders based on the Bronchodilator Assessment Score as indicated below.  Use Inhaler orders unless patient has one or more of the following: on home nebulizer, not able to hold breath for 10 seconds, is not alert and oriented, cannot activate and use MDI correctly, or respiratory rate 25 breaths per minute or more, then use the equivalent nebulizer order(s) with same Frequency and PRN reasons based on the score.  If a patient is on this medication at home then do not decrease Frequency below that used at home.    0-3 - enter or revise RT bronchodilator

## 2024-12-28 NOTE — ED NOTES
Moved to a bigger room, placed on hospital bed. RT at bedside to evaluate.  PT states he has a headache. Will Medicate as prescribed.

## 2024-12-28 NOTE — ED PROVIDER NOTES
EMERGENCY DEPARTMENT ENCOUNTER    Pt Name: Jay Cantu  MRN: [id number]  Birthdate 1970  Date of evaluation: 12/28/24  CHIEF COMPLAINT       Chief Complaint   Patient presents with    Influenza     Diagnosed a couple days ago and tonight had a coughing fit and chest burning with the cough. States has the chills too.      HISTORY OF PRESENT ILLNESS   HPI  54-year-old male with a history of diabetes, kidney stones presents to the ED for worsening shortness of breath, cough, chest pain this evening.  Patient states that about 5 days ago he started having cold symptoms including cough and sore throat, at that time he was evaluated urgent care and tested positive for influenza.  Patient felt that he was getting better until this evening when he started having worsening cough, shortness of breath and chest pain so he came to the ER.  Patient also feels nauseous, he denies abdominal pain, diarrhea, dysuria/hematuria, vision changes, focal weakness/numbness or any other acute complaints.    REVIEW OF SYSTEMS     Review of Systems   All other systems reviewed and are negative.    PASTMEDICAL HISTORY     Past Medical History:   Diagnosis Date    Cutaneous skin tags 7/1/2016    Diabetes mellitus (HCC)     Itching 10/16/2017    Itching 10/16/2017    Kidney stone 7/19/2012    Kidney stone     Non-compliant patient 2/9/2018    Sleep apnea     wears cpap at night     SURGICAL HISTORY       Past Surgical History:   Procedure Laterality Date    LITHOTRIPSY       CURRENT MEDICATIONS       Previous Medications    ASCORBIC ACID (SM CHEWABLE VITAMIN C) 500 MG CHEW    Take 1 tablet by mouth 3 times daily    BETAMETHASONE DIPROPIONATE (DIPROLENE) 0.05 % OINTMENT    Apply topically daily.    CHOLECALCIFEROL (VITAMIN D3) 125 MCG (5000 UT) CAPS    Take 1 capsule by mouth Daily    CONTINUOUS BLOOD GLUC  (FREESTYLE KADEN 14 DAY READER) JESSICA    One unit    CONTINUOUS BLOOD GLUC SENSOR (FREESTYLE KADEN 14 DAY SENSOR) MISC    Apply

## 2024-12-28 NOTE — H&P
alcohol and does not use drugs.    Family History:       Problem Relation Age of Onset    Diabetes Father     Heart Disease Father     High Blood Pressure Father     High Cholesterol Father     Cancer Father     Stroke Father        Diet:  No diet orders on file    Review of systems:   Pertinent positives as noted in the HPI. All other systems reviewed and negative.    PHYSICAL EXAM:  /64   Pulse 96   Temp 100.2 °F (37.9 °C) (Oral)   Resp 21   Ht 1.88 m (6' 2\")   Wt 129.3 kg (285 lb)   SpO2 94%   BMI 36.59 kg/m²   General appearance: No apparent distress, appears stated age and cooperative.  HEENT: Normal cephalic, atraumatic without obvious deformity. Pupils equal, round, and reactive to light.  Extra ocular muscles intact. Conjunctivae/corneas clear.  Neck: Supple, with full range of motion. No jugular venous distention. Trachea midline.  Respiratory:  increased effort. coarse  Cardiovascular: Regular rate and rhythm with normal S1/S2 without murmurs, rubs or gallops.  Abdomen: Soft, non-tender, non-distended with normal bowel sounds.  Musculoskeletal:  No clubbing, cyanosis or edema bilaterally.  Skin: Skin color, texture, turgor normal.  No rashes or lesions.  Neurologic:  Neurovascularly intact without any focal sensory/motor deficits. Cranial nerves: II-XII intact, grossly non-focal.  Psychiatric: Alert and oriented, thought content appropriate, normal insight  Capillary Refill: Brisk,< 3 seconds   Peripheral Pulses: +2 palpable, equal bilaterally     Labs:   Recent Labs     12/28/24  0330   WBC 14.4*   HGB 15.1   HCT 44.9        Recent Labs     12/28/24  0330   *   K 4.0   CL 94*   CO2 23   BUN 15   CREATININE 1.0   CALCIUM 9.9     No results for input(s): \"AST\", \"ALT\", \"BILIDIR\", \"BILITOT\", \"ALKPHOS\" in the last 72 hours.  No results for input(s): \"INR\" in the last 72 hours.  No results for input(s): \"CKTOTAL\", \"TROPONINI\" in the last 72 hours.    Urinalysis:    Lab Results

## 2024-12-28 NOTE — CONSULTS
Pulmonary Medicine and Critical Care Consult    Patient - Jay Cantu   MRN -  [id number]   [facility] # - [id number]   - 1970      Date of Admission -  2024  1:34 AM  Date of evaluation -  2024  Room - [address] [place] [address]   Hospital Day - 0  Consulting - Valerie Phillips MD Primary Care Physician - David Booker MD     Reason for Consult      Respiratory failure  Assessment/recommendations   Acute hypoxic respiratory failure requiring BiPAP  Continue oxygen BiPAP support  Incentive spirometry every hour lower    Multifocal bacterial pneumonia/influenza A pneumonia  DuoNeb by nebulizer  Solu-Medrol IV 40 mg every 12 hour  Cefepime doxycycline  Tamiflu 75 twice daily  Urine Streptococcus Legionella antigen  MRSA swab  Airborne isolation  Outpatient PFT  Follow-up chest x-ray  Follow-up CT chest    Obstructive sleep apnea  BiPAP support as above at home CPAP  Follow-up with Dr. Coyle    Diabetes   Monitor blood sugars on steroid    discussed with RT  Discussed with significant other  Peptic ulcer disease prophylaxis  DVT prophylaxis  Problem List      Patient Active Problem List   Diagnosis    Kidney stone    MARCELLUS on CPAP    Vitamin D deficiency    Bilateral knee pain    Derangement of meniscus    Flea bite    Uncontrolled type 2 diabetes mellitus without complication, without long-term current use of insulin    Essential hypertension    Other hyperlipidemia    Fatigue    Pain of right heel    Dyslipidemia    Glucosuria    Candidal balanitis    Acute otitis media    Sprain of left knee    Diarrhea    COVID-19    Right ankle pain    Elbow pain, left    CRP elevated    Leukopenia    Dermatitis    Community acquired pneumonia due to influenza A virus    Community acquired bacterial pneumonia       HPI     Jay Cantu is 54 y.o.,  male, previous medical history of obstructive sleep apnea on nocturnal CPAP follows with Dr. Coyle hypertension hyperlipidemia diabetes obesity vitamin D

## 2024-12-29 ENCOUNTER — APPOINTMENT (OUTPATIENT)
Dept: GENERAL RADIOLOGY | Age: 54
DRG: 193 | End: 2024-12-29
Payer: OTHER GOVERNMENT

## 2024-12-29 LAB
ALBUMIN SERPL-MCNC: 3.6 G/DL (ref 3.5–5.2)
ALBUMIN/GLOB SERPL: 1.2 {RATIO} (ref 1–2.5)
ALP SERPL-CCNC: 55 U/L (ref 40–129)
ALT SERPL-CCNC: 25 U/L (ref 10–50)
ANION GAP SERPL CALCULATED.3IONS-SCNC: 10 MMOL/L (ref 9–16)
AST SERPL-CCNC: 18 U/L (ref 10–50)
BASOPHILS # BLD: 0.05 K/UL (ref 0–0.2)
BASOPHILS NFR BLD: 0 % (ref 0–2)
BILIRUB SERPL-MCNC: 0.4 MG/DL (ref 0–1.2)
BUN SERPL-MCNC: 13 MG/DL (ref 6–20)
CALCIUM SERPL-MCNC: 9.3 MG/DL (ref 8.6–10.4)
CHLORIDE SERPL-SCNC: 100 MMOL/L (ref 98–107)
CO2 SERPL-SCNC: 26 MMOL/L (ref 20–31)
CREAT SERPL-MCNC: 0.8 MG/DL (ref 0.7–1.2)
EOSINOPHIL # BLD: <0.03 K/UL (ref 0–0.44)
EOSINOPHILS RELATIVE PERCENT: 0 % (ref 1–4)
ERYTHROCYTE [DISTWIDTH] IN BLOOD BY AUTOMATED COUNT: 12.5 % (ref 11.8–14.4)
EST. AVERAGE GLUCOSE BLD GHB EST-MCNC: 332 MG/DL
GFR, ESTIMATED: >90 ML/MIN/1.73M2
GLUCOSE BLD-MCNC: 295 MG/DL (ref 75–110)
GLUCOSE BLD-MCNC: 343 MG/DL (ref 75–110)
GLUCOSE BLD-MCNC: 383 MG/DL (ref 75–110)
GLUCOSE BLD-MCNC: 443 MG/DL (ref 75–110)
GLUCOSE SERPL-MCNC: 265 MG/DL (ref 74–99)
HBA1C MFR BLD: 13.2 % (ref 4–6)
HCT VFR BLD AUTO: 40.8 % (ref 40.7–50.3)
HGB BLD-MCNC: 13.7 G/DL (ref 13–17)
IMM GRANULOCYTES # BLD AUTO: 0.24 K/UL (ref 0–0.3)
IMM GRANULOCYTES NFR BLD: 2 %
L PNEUMO1 AG UR QL IA.RAPID: NEGATIVE
LACTATE BLDV-SCNC: 1.2 MMOL/L (ref 0.5–2.2)
LYMPHOCYTES NFR BLD: 1.3 K/UL (ref 1.1–3.7)
LYMPHOCYTES RELATIVE PERCENT: 8 % (ref 24–43)
MCH RBC QN AUTO: 29.5 PG (ref 25.2–33.5)
MCHC RBC AUTO-ENTMCNC: 33.6 G/DL (ref 28.4–34.8)
MCV RBC AUTO: 87.9 FL (ref 82.6–102.9)
MONOCYTES NFR BLD: 0.57 K/UL (ref 0.1–1.2)
MONOCYTES NFR BLD: 4 % (ref 3–12)
MRSA, DNA, NASAL: NEGATIVE
NEUTROPHILS NFR BLD: 86 % (ref 36–65)
NEUTS SEG NFR BLD: 14.33 K/UL (ref 1.5–8.1)
NRBC BLD-RTO: 0 PER 100 WBC
PLATELET # BLD AUTO: 225 K/UL (ref 138–453)
PMV BLD AUTO: 10.1 FL (ref 8.1–13.5)
POTASSIUM SERPL-SCNC: 4.5 MMOL/L (ref 3.7–5.3)
PROT SERPL-MCNC: 6.5 G/DL (ref 6.6–8.7)
RBC # BLD AUTO: 4.64 M/UL (ref 4.21–5.77)
S PNEUM AG SPEC QL: NEGATIVE
SODIUM SERPL-SCNC: 135 MMOL/L (ref 136–145)
SPECIMEN DESCRIPTION: NORMAL
SPECIMEN SOURCE: NORMAL
WBC OTHER # BLD: 16.5 K/UL (ref 3.5–11.3)

## 2024-12-29 PROCEDURE — 1200000000 HC SEMI PRIVATE

## 2024-12-29 PROCEDURE — 2500000003 HC RX 250 WO HCPCS: Performed by: NURSE PRACTITIONER

## 2024-12-29 PROCEDURE — 80053 COMPREHEN METABOLIC PANEL: CPT

## 2024-12-29 PROCEDURE — 2500000003 HC RX 250 WO HCPCS: Performed by: INTERNAL MEDICINE

## 2024-12-29 PROCEDURE — 6370000000 HC RX 637 (ALT 250 FOR IP): Performed by: NURSE PRACTITIONER

## 2024-12-29 PROCEDURE — 6370000000 HC RX 637 (ALT 250 FOR IP): Performed by: FAMILY MEDICINE

## 2024-12-29 PROCEDURE — 94640 AIRWAY INHALATION TREATMENT: CPT

## 2024-12-29 PROCEDURE — 85025 COMPLETE CBC W/AUTO DIFF WBC: CPT

## 2024-12-29 PROCEDURE — 6370000000 HC RX 637 (ALT 250 FOR IP): Performed by: INTERNAL MEDICINE

## 2024-12-29 PROCEDURE — 2580000003 HC RX 258: Performed by: NURSE PRACTITIONER

## 2024-12-29 PROCEDURE — 36415 COLL VENOUS BLD VENIPUNCTURE: CPT

## 2024-12-29 PROCEDURE — 2700000000 HC OXYGEN THERAPY PER DAY

## 2024-12-29 PROCEDURE — 99223 1ST HOSP IP/OBS HIGH 75: CPT | Performed by: INTERNAL MEDICINE

## 2024-12-29 PROCEDURE — 6360000002 HC RX W HCPCS: Performed by: INTERNAL MEDICINE

## 2024-12-29 PROCEDURE — 71045 X-RAY EXAM CHEST 1 VIEW: CPT

## 2024-12-29 PROCEDURE — 94761 N-INVAS EAR/PLS OXIMETRY MLT: CPT

## 2024-12-29 PROCEDURE — 82947 ASSAY GLUCOSE BLOOD QUANT: CPT

## 2024-12-29 PROCEDURE — 6360000002 HC RX W HCPCS: Performed by: FAMILY MEDICINE

## 2024-12-29 PROCEDURE — 6360000002 HC RX W HCPCS: Performed by: NURSE PRACTITIONER

## 2024-12-29 PROCEDURE — 83605 ASSAY OF LACTIC ACID: CPT

## 2024-12-29 RX ORDER — IPRATROPIUM BROMIDE AND ALBUTEROL SULFATE 2.5; .5 MG/3ML; MG/3ML
1 SOLUTION RESPIRATORY (INHALATION)
Status: DISCONTINUED | OUTPATIENT
Start: 2024-12-29 | End: 2024-12-30

## 2024-12-29 RX ORDER — INSULIN LISPRO 100 [IU]/ML
0-16 INJECTION, SOLUTION INTRAVENOUS; SUBCUTANEOUS
Status: DISCONTINUED | OUTPATIENT
Start: 2024-12-29 | End: 2024-12-29

## 2024-12-29 RX ORDER — LINEZOLID 600 MG/1
600 TABLET, FILM COATED ORAL EVERY 12 HOURS SCHEDULED
Status: DISCONTINUED | OUTPATIENT
Start: 2024-12-29 | End: 2024-12-31 | Stop reason: HOSPADM

## 2024-12-29 RX ORDER — INSULIN LISPRO 100 [IU]/ML
7 INJECTION, SOLUTION INTRAVENOUS; SUBCUTANEOUS ONCE
Status: COMPLETED | OUTPATIENT
Start: 2024-12-29 | End: 2024-12-29

## 2024-12-29 RX ORDER — INSULIN LISPRO 100 [IU]/ML
0-16 INJECTION, SOLUTION INTRAVENOUS; SUBCUTANEOUS
Status: DISCONTINUED | OUTPATIENT
Start: 2024-12-29 | End: 2024-12-31 | Stop reason: HOSPADM

## 2024-12-29 RX ADMIN — ENOXAPARIN SODIUM 30 MG: 100 INJECTION SUBCUTANEOUS at 09:14

## 2024-12-29 RX ADMIN — LINEZOLID 600 MG: 600 TABLET, FILM COATED ORAL at 21:47

## 2024-12-29 RX ADMIN — INSULIN LISPRO 8 UNITS: 100 INJECTION, SOLUTION INTRAVENOUS; SUBCUTANEOUS at 12:36

## 2024-12-29 RX ADMIN — IPRATROPIUM BROMIDE AND ALBUTEROL SULFATE 1 DOSE: 2.5; .5 SOLUTION RESPIRATORY (INHALATION) at 08:04

## 2024-12-29 RX ADMIN — INSULIN LISPRO 7 UNITS: 100 INJECTION, SOLUTION INTRAVENOUS; SUBCUTANEOUS at 09:52

## 2024-12-29 RX ADMIN — INSULIN LISPRO 16 UNITS: 100 INJECTION, SOLUTION INTRAVENOUS; SUBCUTANEOUS at 21:47

## 2024-12-29 RX ADMIN — ACETAMINOPHEN 650 MG: 325 TABLET ORAL at 05:31

## 2024-12-29 RX ADMIN — WATER 1000 MG: 1 INJECTION INTRAMUSCULAR; INTRAVENOUS; SUBCUTANEOUS at 18:39

## 2024-12-29 RX ADMIN — DOXYCYCLINE 100 MG: 100 CAPSULE ORAL at 09:14

## 2024-12-29 RX ADMIN — WATER 40 MG: 1 INJECTION INTRAMUSCULAR; INTRAVENOUS; SUBCUTANEOUS at 16:26

## 2024-12-29 RX ADMIN — OSELTAMIVIR PHOSPHATE 75 MG: 75 CAPSULE ORAL at 09:14

## 2024-12-29 RX ADMIN — INSULIN LISPRO 3 UNITS: 100 INJECTION, SOLUTION INTRAVENOUS; SUBCUTANEOUS at 09:30

## 2024-12-29 RX ADMIN — IPRATROPIUM BROMIDE AND ALBUTEROL SULFATE 1 DOSE: 2.5; .5 SOLUTION RESPIRATORY (INHALATION) at 13:15

## 2024-12-29 RX ADMIN — DOXYCYCLINE 100 MG: 100 CAPSULE ORAL at 21:47

## 2024-12-29 RX ADMIN — CEFEPIME 2000 MG: 2 INJECTION, POWDER, FOR SOLUTION INTRAVENOUS at 02:36

## 2024-12-29 RX ADMIN — SODIUM CHLORIDE, PRESERVATIVE FREE 10 ML: 5 INJECTION INTRAVENOUS at 21:48

## 2024-12-29 RX ADMIN — OSELTAMIVIR PHOSPHATE 75 MG: 75 CAPSULE ORAL at 21:46

## 2024-12-29 RX ADMIN — ENOXAPARIN SODIUM 30 MG: 100 INJECTION SUBCUTANEOUS at 21:46

## 2024-12-29 RX ADMIN — CEFEPIME 2000 MG: 2 INJECTION, POWDER, FOR SOLUTION INTRAVENOUS at 12:16

## 2024-12-29 RX ADMIN — SODIUM CHLORIDE, PRESERVATIVE FREE 10 ML: 5 INJECTION INTRAVENOUS at 09:16

## 2024-12-29 RX ADMIN — IPRATROPIUM BROMIDE AND ALBUTEROL SULFATE 1 DOSE: 2.5; .5 SOLUTION RESPIRATORY (INHALATION) at 19:55

## 2024-12-29 RX ADMIN — WATER 40 MG: 1 INJECTION INTRAMUSCULAR; INTRAVENOUS; SUBCUTANEOUS at 02:37

## 2024-12-29 RX ADMIN — INSULIN LISPRO 16 UNITS: 100 INJECTION, SOLUTION INTRAVENOUS; SUBCUTANEOUS at 18:38

## 2024-12-29 ASSESSMENT — PAIN SCALES - GENERAL: PAINLEVEL_OUTOF10: 0

## 2024-12-29 NOTE — CARE COORDINATION
Case Management Assessment  Initial Evaluation    Date/Time of Evaluation: 12/29/2024 5:28 PM  Assessment Completed by: MARK WYLIE RN    If patient is discharged prior to next notation, then this note serves as note for discharge by case management.    Patient Name: Jay Cantu                   YOB: 1970  Diagnosis: Hypoxia [R09.02]  Community acquired bacterial pneumonia [J15.9]  Community acquired pneumonia due to influenza A virus [J09.X1]                   Date / Time: 12/28/2024  1:34 AM    Patient Admission Status: Inpatient   Readmission Risk (Low < 19, Mod (19-27), High > 27): Readmission Risk Score: 5.2    Current PCP: Joey Ellis MD  PCP verified by CM? Yes    Chart Reviewed: Yes      History Provided by: Patient  Patient Orientation: Alert and Oriented    Patient Cognition: Alert    Hospitalization in the last 30 days (Readmission):  No    If yes, Readmission Assessment in  Navigator will be completed.    Advance Directives:      Code Status: Full Code   Patient's Primary Decision Maker is: Legal Next of Kin      Discharge Planning:    Patient lives with: Spouse/Significant Other Type of Home: House  Primary Care Giver: Self  Patient Support Systems include: Spouse/Significant Other, Family Members   Current Financial resources: Other (Comment) ([facility])  Current community resources: None  Current services prior to admission: C-pap            Current DME:              Type of Home Care services:  None    ADLS  Prior functional level: Independent in ADLs/IADLs  Current functional level: Independent in ADLs/IADLs    PT AM-PAC:   /24  OT AM-PAC:   /24    Family can provide assistance at DC: Yes  Would you like Case Management to discuss the discharge plan with any other family members/significant others, and if so, who? Yes  Plans to Return to Present Housing: Yes  Other Identified Issues/Barriers to RETURNING to current housing: pulmonary   Potential Assistance needed at

## 2024-12-29 NOTE — RT PROTOCOL NOTE
RT Inhaler-Nebulizer Bronchodilator Protocol Note    There is a bronchodilator order in the chart from a provider indicating to follow the RT Bronchodilator Protocol and there is an “Initiate RT Inhaler-Nebulizer Bronchodilator Protocol” order as well (see protocol at bottom of note).    CXR Findings:  XR CHEST PORTABLE    Result Date: 12/29/2024  Slight interval progression of bibasilar airspace disease, which could represent atelectasis or pneumonia.     XR CHEST PORTABLE    Result Date: 12/28/2024  No focal infiltrates.       The findings from the last RT Protocol Assessment were as follows:   History Pulmonary Disease: Smoker 15 pack years or more  Respiratory Pattern: Mild dyspnea at rest, irregular pattern, or RR 21-25 bpm  Breath Sounds: Intermittent or unilateral wheezes  Cough: Strong, spontaneous, non-productive  Indication for Bronchodilator Therapy: Wheezing associated with pulm disorder  Bronchodilator Assessment Score: 9    Aerosolized bronchodilator medication orders have been revised according to the RT Inhaler-Nebulizer Bronchodilator Protocol below.    Respiratory Therapist to perform RT Therapy Protocol Assessment initially then follow the protocol.  Repeat RT Therapy Protocol Assessment PRN for score 0-3 or on second treatment, BID, and PRN for scores above 3.    No Indications - adjust the frequency to every 6 hours PRN wheezing or bronchospasm, if no treatments needed after 48 hours then discontinue using Per Protocol order mode.     If indication present, adjust the RT bronchodilator orders based on the Bronchodilator Assessment Score as indicated below.  Use Inhaler orders unless patient has one or more of the following: on home nebulizer, not able to hold breath for 10 seconds, is not alert and oriented, cannot activate and use MDI correctly, or respiratory rate 25 breaths per minute or more, then use the equivalent nebulizer order(s) with same Frequency and PRN reasons based on the score.

## 2024-12-29 NOTE — CONSULTS
[facility]  Initial Consult Note  Date: 12/29/2024    Hospital day :1     Impression:   Influenza A with secondary bacterial pneumonia  CT scan was independently reviewed and revealed pneumonia, predominantly in the left lower lobe. Given the timing and presentation, this is likely a case of influenza complicated by secondary bacterial pneumonia.  The main concern is for possible staph infection, a common complication of influenza.  Diabetes mellitus type II  Recent urinary tract symptoms  Patient reported recent burning sensation during urination, which has since resolved.   This could be related to a mild urinary tract infection or dehydration associated with the flu illness.    Recommendations   Patient was diagnosed with influenza A at urgent care and again during current hospitalization.    Switch antibiotic from cefepime to ceftriaxone (Rocephin)  Add linezolid (Zyvox) for potential staph coverage  Continue supportive care and monitoring    Chief complaint/reason for consultation:     Question Answer   Reason for Consult? Multifocal pneumonia     History of Present Illness:   Jay Cantu is a 54 y.o.-year-old male who was initially admitted on 12/28/2024.   Jay Cantu, presents with a history of flu after receiving flu and shingles shots approximately 2-3 weeks ago. He reports initially experiencing a mild cough, which progressed to heavy coughing and chest burning 3-4 days ago. The cough is described as a dry hacking cough, with no productive sputum. He denies any vomiting but reports experiencing chills and lower back pain after the vaccination.    Jay visited urgent care about a week ago due to feeling unwell and was diagnosed with the flu, not COVID-19. He was prescribed Tamiflu but did not  the medication. The coughing spells began approximately 2 days after the urgent care visit. He reports exposure to sick grandchildren.    The patient has a medical history of diabetes

## 2024-12-30 ENCOUNTER — APPOINTMENT (OUTPATIENT)
Dept: GENERAL RADIOLOGY | Age: 54
DRG: 193 | End: 2024-12-30
Payer: OTHER GOVERNMENT

## 2024-12-30 LAB
ANION GAP SERPL CALCULATED.3IONS-SCNC: 14 MMOL/L (ref 9–16)
BASOPHILS # BLD: 0 K/UL (ref 0–0.2)
BASOPHILS NFR BLD: 0 % (ref 0–2)
BUN SERPL-MCNC: 17 MG/DL (ref 6–20)
CALCIUM SERPL-MCNC: 9.2 MG/DL (ref 8.6–10.4)
CHLORIDE SERPL-SCNC: 96 MMOL/L (ref 98–107)
CO2 SERPL-SCNC: 24 MMOL/L (ref 20–31)
CREAT SERPL-MCNC: 1.1 MG/DL (ref 0.7–1.2)
EOSINOPHIL # BLD: 0 K/UL (ref 0–0.44)
EOSINOPHILS RELATIVE PERCENT: 0 % (ref 1–4)
ERYTHROCYTE [DISTWIDTH] IN BLOOD BY AUTOMATED COUNT: 12.4 % (ref 11.8–14.4)
GFR, ESTIMATED: 81 ML/MIN/1.73M2
GLUCOSE BLD-MCNC: 317 MG/DL (ref 75–110)
GLUCOSE BLD-MCNC: 388 MG/DL (ref 75–110)
GLUCOSE BLD-MCNC: 410 MG/DL (ref 75–110)
GLUCOSE BLD-MCNC: 429 MG/DL (ref 75–110)
GLUCOSE BLD-MCNC: 433 MG/DL (ref 75–110)
GLUCOSE SERPL-MCNC: 481 MG/DL (ref 74–99)
HCT VFR BLD AUTO: 38.9 % (ref 40.7–50.3)
HGB BLD-MCNC: 13.3 G/DL (ref 13–17)
IMM GRANULOCYTES # BLD AUTO: 0.7 K/UL (ref 0–0.3)
IMM GRANULOCYTES NFR BLD: 5 %
LYMPHOCYTES NFR BLD: 0.84 K/UL (ref 1.1–3.7)
LYMPHOCYTES RELATIVE PERCENT: 6 % (ref 24–43)
MCH RBC QN AUTO: 30.1 PG (ref 25.2–33.5)
MCHC RBC AUTO-ENTMCNC: 34.2 G/DL (ref 28.4–34.8)
MCV RBC AUTO: 88 FL (ref 82.6–102.9)
MONOCYTES NFR BLD: 0.28 K/UL (ref 0.1–1.2)
MONOCYTES NFR BLD: 2 % (ref 3–12)
NEUTROPHILS NFR BLD: 87 % (ref 36–65)
NEUTS SEG NFR BLD: 12.18 K/UL (ref 1.5–8.1)
NRBC BLD-RTO: 0 PER 100 WBC
PLATELET # BLD AUTO: 293 K/UL (ref 138–453)
PMV BLD AUTO: 10.1 FL (ref 8.1–13.5)
POTASSIUM SERPL-SCNC: 4.8 MMOL/L (ref 3.7–5.3)
RBC # BLD AUTO: 4.42 M/UL (ref 4.21–5.77)
SODIUM SERPL-SCNC: 134 MMOL/L (ref 136–145)
WBC OTHER # BLD: 14 K/UL (ref 3.5–11.3)

## 2024-12-30 PROCEDURE — APPSS30 APP SPLIT SHARED TIME 16-30 MINUTES: Performed by: NURSE PRACTITIONER

## 2024-12-30 PROCEDURE — 80048 BASIC METABOLIC PNL TOTAL CA: CPT

## 2024-12-30 PROCEDURE — 2700000000 HC OXYGEN THERAPY PER DAY

## 2024-12-30 PROCEDURE — 99232 SBSQ HOSP IP/OBS MODERATE 35: CPT | Performed by: NURSE PRACTITIONER

## 2024-12-30 PROCEDURE — 6370000000 HC RX 637 (ALT 250 FOR IP): Performed by: INTERNAL MEDICINE

## 2024-12-30 PROCEDURE — 2500000003 HC RX 250 WO HCPCS: Performed by: NURSE PRACTITIONER

## 2024-12-30 PROCEDURE — 6360000002 HC RX W HCPCS: Performed by: FAMILY MEDICINE

## 2024-12-30 PROCEDURE — 6360000002 HC RX W HCPCS: Performed by: INTERNAL MEDICINE

## 2024-12-30 PROCEDURE — 6370000000 HC RX 637 (ALT 250 FOR IP): Performed by: FAMILY MEDICINE

## 2024-12-30 PROCEDURE — 94640 AIRWAY INHALATION TREATMENT: CPT

## 2024-12-30 PROCEDURE — 6370000000 HC RX 637 (ALT 250 FOR IP): Performed by: NURSE PRACTITIONER

## 2024-12-30 PROCEDURE — 1200000000 HC SEMI PRIVATE

## 2024-12-30 PROCEDURE — 2500000003 HC RX 250 WO HCPCS: Performed by: INTERNAL MEDICINE

## 2024-12-30 PROCEDURE — 82947 ASSAY GLUCOSE BLOOD QUANT: CPT

## 2024-12-30 PROCEDURE — 2500000003 HC RX 250 WO HCPCS: Performed by: FAMILY MEDICINE

## 2024-12-30 PROCEDURE — 86738 MYCOPLASMA ANTIBODY: CPT

## 2024-12-30 PROCEDURE — 85025 COMPLETE CBC W/AUTO DIFF WBC: CPT

## 2024-12-30 PROCEDURE — 71045 X-RAY EXAM CHEST 1 VIEW: CPT

## 2024-12-30 PROCEDURE — 94761 N-INVAS EAR/PLS OXIMETRY MLT: CPT

## 2024-12-30 PROCEDURE — 36415 COLL VENOUS BLD VENIPUNCTURE: CPT

## 2024-12-30 RX ORDER — IPRATROPIUM BROMIDE AND ALBUTEROL SULFATE 2.5; .5 MG/3ML; MG/3ML
1 SOLUTION RESPIRATORY (INHALATION)
Status: DISCONTINUED | OUTPATIENT
Start: 2024-12-30 | End: 2024-12-31

## 2024-12-30 RX ORDER — LACTOBACILLUS RHAMNOSUS GG 10B CELL
1 CAPSULE ORAL
Status: DISCONTINUED | OUTPATIENT
Start: 2024-12-30 | End: 2024-12-31 | Stop reason: HOSPADM

## 2024-12-30 RX ORDER — PREDNISONE 20 MG/1
40 TABLET ORAL DAILY
Status: DISCONTINUED | OUTPATIENT
Start: 2024-12-30 | End: 2024-12-31 | Stop reason: HOSPADM

## 2024-12-30 RX ADMIN — IPRATROPIUM BROMIDE AND ALBUTEROL SULFATE 1 DOSE: 2.5; .5 SOLUTION RESPIRATORY (INHALATION) at 08:00

## 2024-12-30 RX ADMIN — ENOXAPARIN SODIUM 30 MG: 100 INJECTION SUBCUTANEOUS at 10:10

## 2024-12-30 RX ADMIN — LINEZOLID 600 MG: 600 TABLET, FILM COATED ORAL at 22:10

## 2024-12-30 RX ADMIN — WATER 40 MG: 1 INJECTION INTRAMUSCULAR; INTRAVENOUS; SUBCUTANEOUS at 02:49

## 2024-12-30 RX ADMIN — OSELTAMIVIR PHOSPHATE 75 MG: 75 CAPSULE ORAL at 22:10

## 2024-12-30 RX ADMIN — INSULIN LISPRO 16 UNITS: 100 INJECTION, SOLUTION INTRAVENOUS; SUBCUTANEOUS at 18:35

## 2024-12-30 RX ADMIN — WATER 1000 MG: 1 INJECTION INTRAMUSCULAR; INTRAVENOUS; SUBCUTANEOUS at 18:35

## 2024-12-30 RX ADMIN — INSULIN LISPRO 16 UNITS: 100 INJECTION, SOLUTION INTRAVENOUS; SUBCUTANEOUS at 22:10

## 2024-12-30 RX ADMIN — SODIUM CHLORIDE, PRESERVATIVE FREE 10 ML: 5 INJECTION INTRAVENOUS at 10:11

## 2024-12-30 RX ADMIN — DOXYCYCLINE 100 MG: 100 CAPSULE ORAL at 10:09

## 2024-12-30 RX ADMIN — PREDNISONE 40 MG: 20 TABLET ORAL at 10:10

## 2024-12-30 RX ADMIN — INSULIN LISPRO 16 UNITS: 100 INJECTION, SOLUTION INTRAVENOUS; SUBCUTANEOUS at 10:10

## 2024-12-30 RX ADMIN — SODIUM CHLORIDE, PRESERVATIVE FREE 5 ML: 5 INJECTION INTRAVENOUS at 22:55

## 2024-12-30 RX ADMIN — LINEZOLID 600 MG: 600 TABLET, FILM COATED ORAL at 10:10

## 2024-12-30 RX ADMIN — SODIUM CHLORIDE, PRESERVATIVE FREE 10 ML: 5 INJECTION INTRAVENOUS at 22:10

## 2024-12-30 RX ADMIN — INSULIN LISPRO 16 UNITS: 100 INJECTION, SOLUTION INTRAVENOUS; SUBCUTANEOUS at 13:14

## 2024-12-30 RX ADMIN — Medication 1 CAPSULE: at 10:23

## 2024-12-30 RX ADMIN — OSELTAMIVIR PHOSPHATE 75 MG: 75 CAPSULE ORAL at 10:10

## 2024-12-30 RX ADMIN — IPRATROPIUM BROMIDE AND ALBUTEROL SULFATE 1 DOSE: 2.5; .5 SOLUTION RESPIRATORY (INHALATION) at 20:44

## 2024-12-30 ASSESSMENT — ENCOUNTER SYMPTOMS
RESPIRATORY NEGATIVE: 1
DIARRHEA: 1
NAUSEA: 0
ABDOMINAL PAIN: 0

## 2024-12-30 NOTE — RT PROTOCOL NOTE
RT Inhaler-Nebulizer Bronchodilator Protocol Note    There is a bronchodilator order in the chart from a provider indicating to follow the RT Bronchodilator Protocol and there is an “Initiate RT Inhaler-Nebulizer Bronchodilator Protocol” order as well (see protocol at bottom of note).    CXR Findings:  XR CHEST PORTABLE    Result Date: 12/29/2024  Slight interval progression of bibasilar airspace disease which could represent atelectasis or pneumonia.       The findings from the last RT Protocol Assessment were as follows:   History Pulmonary Disease: Smoker 15 pack years or more  Respiratory Pattern: Dyspnea on exertion or RR 21-25 bpm  Breath Sounds: Slightly diminished and/or crackles  Cough: Strong, spontaneous, non-productive  Indication for Bronchodilator Therapy: Wheezing associated with pulm disorder  Bronchodilator Assessment Score: 5    Aerosolized bronchodilator medication orders have been revised according to the RT Inhaler-Nebulizer Bronchodilator Protocol below.    Respiratory Therapist to perform RT Therapy Protocol Assessment initially then follow the protocol.  Repeat RT Therapy Protocol Assessment PRN for score 0-3 or on second treatment, BID, and PRN for scores above 3.    No Indications - adjust the frequency to every 6 hours PRN wheezing or bronchospasm, if no treatments needed after 48 hours then discontinue using Per Protocol order mode.     If indication present, adjust the RT bronchodilator orders based on the Bronchodilator Assessment Score as indicated below.  Use Inhaler orders unless patient has one or more of the following: on home nebulizer, not able to hold breath for 10 seconds, is not alert and oriented, cannot activate and use MDI correctly, or respiratory rate 25 breaths per minute or more, then use the equivalent nebulizer order(s) with same Frequency and PRN reasons based on the score.  If a patient is on this medication at home then do not decrease Frequency below that used at

## 2024-12-31 VITALS
HEIGHT: 74 IN | TEMPERATURE: 98 F | SYSTOLIC BLOOD PRESSURE: 135 MMHG | OXYGEN SATURATION: 94 % | DIASTOLIC BLOOD PRESSURE: 80 MMHG | RESPIRATION RATE: 18 BRPM | BODY MASS INDEX: 34.22 KG/M2 | WEIGHT: 266.6 LBS | HEART RATE: 63 BPM

## 2024-12-31 LAB
ANION GAP SERPL CALCULATED.3IONS-SCNC: 10 MMOL/L (ref 9–16)
BASOPHILS # BLD: 0.13 K/UL (ref 0–0.2)
BASOPHILS NFR BLD: 1 % (ref 0–2)
BUN SERPL-MCNC: 16 MG/DL (ref 6–20)
CALCIUM SERPL-MCNC: 9.1 MG/DL (ref 8.6–10.4)
CHLORIDE SERPL-SCNC: 101 MMOL/L (ref 98–107)
CO2 SERPL-SCNC: 25 MMOL/L (ref 20–31)
CREAT SERPL-MCNC: 0.9 MG/DL (ref 0.7–1.2)
EOSINOPHIL # BLD: 0 K/UL (ref 0–0.44)
EOSINOPHILS RELATIVE PERCENT: 0 % (ref 1–4)
ERYTHROCYTE [DISTWIDTH] IN BLOOD BY AUTOMATED COUNT: 12.5 % (ref 11.8–14.4)
GFR, ESTIMATED: >90 ML/MIN/1.73M2
GLUCOSE BLD-MCNC: 243 MG/DL (ref 75–110)
GLUCOSE BLD-MCNC: 331 MG/DL (ref 75–110)
GLUCOSE SERPL-MCNC: 267 MG/DL (ref 74–99)
HCT VFR BLD AUTO: 37.4 % (ref 40.7–50.3)
HGB BLD-MCNC: 12.6 G/DL (ref 13–17)
IMM GRANULOCYTES # BLD AUTO: 0.66 K/UL (ref 0–0.3)
IMM GRANULOCYTES NFR BLD: 5 %
LYMPHOCYTES NFR BLD: 1.98 K/UL (ref 1.1–3.7)
LYMPHOCYTES RELATIVE PERCENT: 15 % (ref 24–43)
MCH RBC QN AUTO: 29.4 PG (ref 25.2–33.5)
MCHC RBC AUTO-ENTMCNC: 33.7 G/DL (ref 28.4–34.8)
MCV RBC AUTO: 87.2 FL (ref 82.6–102.9)
MONOCYTES NFR BLD: 0.53 K/UL (ref 0.1–1.2)
MONOCYTES NFR BLD: 4 % (ref 3–12)
NEUTROPHILS NFR BLD: 75 % (ref 36–65)
NEUTS SEG NFR BLD: 9.9 K/UL (ref 1.5–8.1)
NRBC BLD-RTO: 0 PER 100 WBC
PLATELET # BLD AUTO: 313 K/UL (ref 138–453)
PMV BLD AUTO: 10 FL (ref 8.1–13.5)
POTASSIUM SERPL-SCNC: 3.7 MMOL/L (ref 3.7–5.3)
RBC # BLD AUTO: 4.29 M/UL (ref 4.21–5.77)
SODIUM SERPL-SCNC: 136 MMOL/L (ref 136–145)
WBC OTHER # BLD: 13.2 K/UL (ref 3.5–11.3)

## 2024-12-31 PROCEDURE — 6370000000 HC RX 637 (ALT 250 FOR IP): Performed by: INTERNAL MEDICINE

## 2024-12-31 PROCEDURE — 82947 ASSAY GLUCOSE BLOOD QUANT: CPT

## 2024-12-31 PROCEDURE — 99232 SBSQ HOSP IP/OBS MODERATE 35: CPT | Performed by: NURSE PRACTITIONER

## 2024-12-31 PROCEDURE — 6370000000 HC RX 637 (ALT 250 FOR IP): Performed by: NURSE PRACTITIONER

## 2024-12-31 PROCEDURE — 80048 BASIC METABOLIC PNL TOTAL CA: CPT

## 2024-12-31 PROCEDURE — 6370000000 HC RX 637 (ALT 250 FOR IP): Performed by: FAMILY MEDICINE

## 2024-12-31 PROCEDURE — 85025 COMPLETE CBC W/AUTO DIFF WBC: CPT

## 2024-12-31 PROCEDURE — 36415 COLL VENOUS BLD VENIPUNCTURE: CPT

## 2024-12-31 PROCEDURE — 94761 N-INVAS EAR/PLS OXIMETRY MLT: CPT

## 2024-12-31 RX ORDER — OSELTAMIVIR PHOSPHATE 75 MG/1
75 CAPSULE ORAL 2 TIMES DAILY
Qty: 2 CAPSULE | Refills: 0 | Status: SHIPPED | OUTPATIENT
Start: 2024-12-31 | End: 2025-01-01

## 2024-12-31 RX ORDER — IPRATROPIUM BROMIDE AND ALBUTEROL SULFATE 2.5; .5 MG/3ML; MG/3ML
1 SOLUTION RESPIRATORY (INHALATION) EVERY 4 HOURS PRN
Status: DISCONTINUED | OUTPATIENT
Start: 2024-12-31 | End: 2024-12-31 | Stop reason: HOSPADM

## 2024-12-31 RX ORDER — LINEZOLID 600 MG/1
600 TABLET, FILM COATED ORAL 2 TIMES DAILY
Qty: 6 TABLET | Refills: 0 | Status: SHIPPED | OUTPATIENT
Start: 2024-12-31 | End: 2025-01-03

## 2024-12-31 RX ORDER — CEFDINIR 300 MG/1
300 CAPSULE ORAL 2 TIMES DAILY
Qty: 14 CAPSULE | Refills: 0 | Status: SHIPPED | OUTPATIENT
Start: 2024-12-31 | End: 2025-01-07

## 2024-12-31 RX ORDER — PREDNISONE 20 MG/1
50 TABLET ORAL 2 TIMES DAILY
Qty: 10 TABLET | Refills: 0 | Status: SHIPPED | OUTPATIENT
Start: 2024-12-31 | End: 2025-01-02

## 2024-12-31 RX ORDER — ALBUTEROL SULFATE 90 UG/1
2 INHALANT RESPIRATORY (INHALATION) 4 TIMES DAILY PRN
Qty: 18 G | Refills: 0 | Status: SHIPPED | OUTPATIENT
Start: 2024-12-31

## 2024-12-31 RX ADMIN — Medication 1 CAPSULE: at 08:52

## 2024-12-31 RX ADMIN — OSELTAMIVIR PHOSPHATE 75 MG: 75 CAPSULE ORAL at 08:42

## 2024-12-31 RX ADMIN — INSULIN LISPRO 12 UNITS: 100 INJECTION, SOLUTION INTRAVENOUS; SUBCUTANEOUS at 12:18

## 2024-12-31 RX ADMIN — PREDNISONE 40 MG: 20 TABLET ORAL at 08:42

## 2024-12-31 RX ADMIN — INSULIN LISPRO 4 UNITS: 100 INJECTION, SOLUTION INTRAVENOUS; SUBCUTANEOUS at 06:51

## 2024-12-31 RX ADMIN — LINEZOLID 600 MG: 600 TABLET, FILM COATED ORAL at 08:42

## 2024-12-31 ASSESSMENT — ENCOUNTER SYMPTOMS
RESPIRATORY NEGATIVE: 1
GASTROINTESTINAL NEGATIVE: 1

## 2024-12-31 NOTE — PROGRESS NOTES
12/30/24 0801   Oxygen Therapy/Pulse Ox   O2 Therapy (S)  Oxygen   $Oxygen (S)  $Daily Charge   O2 Device (S)  Nasal cannula   O2 Flow Rate (L/min) (S)  3 L/min       
  [facility]  Progress Note    Jay Cantu  MRN: [id number]  Date: 12/31/2024  LOS: 3     Reason for F/U :   Influenza A    Impression :   Influenza A with secondary bacterial pneumonia  CT scan showed pneumonia, predominantly in the left lower lobe, likely secondary bacterial pneumonia  Concern for possible staph infection, common complication of influenza  Diabetes mellitus type 2  Recent urinary tract infection  Patient reported recent burning sensation during urination, which has since resolved  Could be related to urinary tract infection or dehydration associated with the flu illness    Recommendations:   Patient continues on ceftriaxone and linezolid  Patient was tested positive for influenza A at urgent care, he did not take his outpatient Tamiflu.  He currently continues on Tamiflu here  He is okay for discharge on oral cephalosporin as prescribed by primary service, would also recommend linezolid    Infection Control Recommendations:   Droplet precautions    Discharge Planning:   Estimated Length of IV antimicrobials: To be determined  Patient will need Midline Catheter Insertion/ PICC line Insertion: No  Patient will need: Home IV , Infusion Center,  SNF,  LTAC: Undetermined  Patient willneed outpatient wound care: No    Medical Decision making / Summary of Stay:   Jay Cantu is a 54 y.o.-year-old male who was initially admitted on 12/28/2024.   Jay Cantu, presents with a history of flu after receiving flu and shingles shots approximately 2-3 weeks ago. He reports initially experiencing a mild cough, which progressed to heavy coughing and chest burning 3-4 days ago. The cough is described as a dry hacking cough, with no productive sputum. He denies any vomiting but reports experiencing chills and lower back pain after the vaccination.     Jay visited urgent care about a week ago due to feeling unwell and was diagnosed with the flu, not COVID-19. He was prescribed Tamiflu but did not 
  [facility]  Progress Note    Jay Cantu  MRN: [id number]  Date: 12/30/2024  LOS: 2     Reason for F/U :   Influenza A    Impression :   Influenza A with secondary bacterial pneumonia  CT scan showed pneumonia, predominantly in the left lower lobe, likely secondary bacterial pneumonia  Concern for possible staph infection, common complication of influenza  Diabetes mellitus type 2  Recent urinary tract infection  Patient reported recent burning sensation during urination, which has since resolved  Could be related to urinary tract infection or dehydration associated with the flu illness    Recommendations:   Patient continues on ceftriaxone and linezolid  PO doxycycline was discontinued  Patient was tested positive for influenza A at urgent care, he did not take his outpatient Tamiflu.  He currently continues on Tamiflu here  Patient is now having diarrhea, probiotic started by primary service  Continue supportive care    Infection Control Recommendations:   Droplet precautions    Discharge Planning:   Estimated Length of IV antimicrobials: To be determined  Patient will need Midline Catheter Insertion/ PICC line Insertion: No  Patient will need: Home IV , Infusion Center,  SNF,  LTAC: Undetermined  Patient willneed outpatient wound care: No    Medical Decision making / Summary of Stay:   Jay Cantu is a 54 y.o.-year-old male who was initially admitted on 12/28/2024.   Jay Cantu, presents with a history of flu after receiving flu and shingles shots approximately 2-3 weeks ago. He reports initially experiencing a mild cough, which progressed to heavy coughing and chest burning 3-4 days ago. The cough is described as a dry hacking cough, with no productive sputum. He denies any vomiting but reports experiencing chills and lower back pain after the vaccination.     Jay visited urgent care about a week ago due to feeling unwell and was diagnosed with the flu, not COVID-19. He was prescribed 
CLINICAL PHARMACY NOTE: MEDS TO BEDS    Total # of Prescriptions Filled: 5   The following medications were delivered to the patient:  Cefdinir 300mg  Albuterol hfa 108 inhaler  Linezolid 600mg  Prednisone 20mg  Oseltamivir 75mg    Additional Documentation:  
Delsym cough medicine administered per patient request early. Pharmacy changed order from 0900 to 0500. Pt had refused dose at 2100 on 12/30/24 .  
Patient off BiPAP; eating breakfast.  
Pt admitted to room 1020 from ED. Admission documentation complete, vitals taken and telemetry placed. Pt oriented to room and unit routine, including hourly rounding. Call light in reach and safety maintained. Will continue to provide support and education.    
Pt admitted to room from Progressive unit  Oriented to room and call light/tv controls.  Bed in lowest position, wheels locked, 2/4 side rails up  Call light in reach, room free of clutter, adequate lighting provided.   
Pt discharged to home with belongings via spouse.  Discharge instructions given. AVS understood.  \"Meds To Beds\" medication at bedside.  Pt denies having any further questions at this time  personal items given to patient at discharge  Patient/family state they have everything they were admitted with.  
Pulmonary Critical Care Progress Note    Patient seen for the follow up of Community acquired pneumonia due to influenza A virus     Subjective:      He has been weaned off oxygen.  He has used his home CPAP.  He denies chest pain.  He has improved shortness of breath and cough.  No hemoptysis.      Examination:    Vitals: /80   Pulse 63   Temp 98 °F (36.7 °C) (Oral)   Resp 18   Ht 1.88 m (6' 2\")   Wt 120.9 kg (266 lb 9.6 oz)   SpO2 94%   BMI 34.23 kg/m²   SpO2  Av.2 %  Min: 94 %  Max: 96 %  General appearance: alert and cooperative with exam  Neck: No JVD  Lungs: Decreased breath sound no crackles or wheeze  Heart: regular rate and rhythm, S1, S2 normal, no gallop  Abdomen: Soft, non tender, + BS  Extremities: no cyanosis or clubbing. No significant edema    LABs:    CBC:   Recent Labs     24  1012 24  0543   WBC 14.0* 13.2*   HGB 13.3 12.6*   HCT 38.9* 37.4*    313     BMP:   Recent Labs     24  1012 24  0543   * 136   K 4.8 3.7   CO2 24 25   BUN 17 16   CREATININE 1.1 0.9   LABGLOM 81 >90   GLUCOSE 481* 267*     PT/INR: No results for input(s): \"PROTIME\", \"INR\" in the last 72 hours.  APTT:No results for input(s): \"APTT\" in the last 72 hours.  LIVER PROFILE:  Recent Labs     24  0459   AST 18   ALT 25        Latest Reference Range & Units 24 15:00 24 15:18 24 15:19   MRSA DNA PROBE, NASAL  Negative     Legionella Pneumophilia Ag, Urine NEGATIVE   NEGATIVE    LEGIONELLA ANTIGEN, URINE   Rpt    Strep pneumo Ag    NEGATIVE   Rpt: View report in Results Review for more information  Radiology:  Chest x-ray   No acute findings.  Bibasilar opacities are no longer identified.       Chest x-ray 2024  Slight interval progression of bibasilar airspace disease which could  represent atelectasis or pneumonia.      Impression/recommendation:    Acute hypoxic respiratory failure requiring BiPAP  Continue oxygen to use with home 
Pulmonary Critical Care Progress Note    Patient seen for the follow up of Community acquired pneumonia due to influenza A virus     Subjective:      He tolerated BiPAP now improved oxygen saturation above 90% on room air.  He has improved shortness of breath.  Has cough with blood-tinged sputum.    Examination:    Vitals: BP (!) 146/84   Pulse 74   Temp 98.2 °F (36.8 °C) (Oral)   Resp 18   Ht 1.88 m (6' 2\")   Wt 129.3 kg (285 lb)   SpO2 97%   BMI 36.59 kg/m²   SpO2  Av.3 %  Min: 95 %  Max: 100 %  General appearance: alert and cooperative with exam  Neck: No JVD  Lungs: Decreased breath sound no crackles or wheeze  Heart: regular rate and rhythm, S1, S2 normal, no gallop  Abdomen: Soft, non tender, + BS  Extremities: no cyanosis or clubbing. No significant edema    LABs:    CBC:   Recent Labs     240 24   WBC 14.4* 16.5*   HGB 15.1 13.7   HCT 44.9 40.8    225     BMP:   Recent Labs     240 24   * 135*   K 4.0 4.5   CO2 23 26   BUN 15 13   CREATININE 1.0 0.8   LABGLOM >90 >90   GLUCOSE 286* 265*     PT/INR: No results for input(s): \"PROTIME\", \"INR\" in the last 72 hours.  APTT:No results for input(s): \"APTT\" in the last 72 hours.  LIVER PROFILE:  Recent Labs     24   AST 18   ALT 25      Latest Reference Range & Units 24 15:00 24 15:18 24 15:19   MRSA DNA PROBE, NASAL  Rpt     Legionella Pneumophilia Ag, Urine NEGATIVE   NEGATIVE    LEGIONELLA ANTIGEN, URINE   Rpt    Strep pneumo Ag    NEGATIVE   Rpt: View report in Results Review for more information    Radiology:    Chest x-ray 2024  Slight interval progression of bibasilar airspace disease which could  represent atelectasis or pneumonia.      Impression/recommendation:    Acute hypoxic respiratory failure requiring BiPAP  Continue oxygen BiPAP support  Incentive spirometry every hour while awake  Home O2 evaluation before discharge     Multifocal bacterial 
Report given to [facility]. Pt transported via wheelchair with all belongings to room 2103.  
Upon initiation of NIV patient is educated on the need to be NPO, to not interrupt NIV except for routine oral care, and the need for increased monitoring requirements.  Purpose and advantages of NIV discussed.  Patient instructed to immediately report nausea, chest discomfort, sudden increase in shortness of breath or severe headache.  
[x] Medication Reconciliation was completed and the patient's home medication list was verified. The Med List Status is \"Complete\". The following sources were used to assist with Medication Reconciliation:    [] Med Rec Pharmacist already completed   [] Patient had a list of medications which was transcribed into the EHR.  [] Patient provided bottles of their medications  [x] Home medications reviewed and confirmed with patient  [] Contacted patient's pharmacy to confirm home medications  [] Contacted patient's physician office to confirm home medications  [] Medical Records from another facility and/or Care Everywhere were reviewed  [] MAR from facility requested to be faxed over  [] Unable to complete due to patient condition  [] Unable to validate home medications      [] There are one or more home medications that need clarification before Medication Reconciliation can be completed. The Med List Status has been marked as In Progress.     To assist with Home Medication Reconciliation the following actions have been taken:    [] Pharmacy medication reconciliation service requested. (Note: This can be done by sending a Perfect Serve message to The Med Rec Pharmacist or by phoning [phone].)  [] Family requested to bring medications into the hospital  [] Family requested to call hospital with medication list  [] Message left with physician office  [] Request for medical records made to   [] Other        
evaluation before discharge     Multifocal bacterial pneumonia/influenza A pneumonia/hemoptysis  DuoNeb by nebulizer  Prednisone 40 daily taper to stop/off Solu-Medrol  Rocephin linezolid consider stopping per ID given negative MRSA  Tamiflu 75 twice daily  Airborne isolation  Outpatient PFT  Monitor hemoptysis  Follow-up chest x-ray  Follow-up CT chest outpatient     Obstructive sleep apnea  Home CPAP for sleep with oxygen off BiPAP for now  Follow-up with Dr. Coyle     Diabetes   Monitor blood sugars on steroid     Discharge planning  Discussed with RN  Peptic ulcer disease prophylaxis  DVT prophylaxis    Garcia Tony MD, MD, [facility]  Pulmonary Critical Care and Sleep Medicine,  [facility]  Cell: [phone]  Office: [phone]      
  CREATININE 1.0 0.8   CALCIUM 9.9 9.3     Recent Labs     12/29/24  0459   AST 18   ALT 25   BILITOT 0.4   ALKPHOS 55     No results for input(s): \"INR\" in the last 72 hours.  No results for input(s): \"CKTOTAL\", \"TROPONINI\" in the last 72 hours.    Urinalysis:    Lab Results   Component Value Date/Time    NITRU NEGATIVE 08/12/2020 08:15 PM    WBCUA 0 TO 2 11/21/2017 03:30 PM    BACTERIA NOT REPORTED 11/21/2017 03:30 PM    RBCUA 2 TO 5 11/21/2017 03:30 PM    GLUCOSEU TRACE 08/12/2020 08:15 PM       Radiology:   XR CHEST PORTABLE   Preliminary Result   Slight interval progression of bibasilar airspace disease, which could   represent atelectasis or pneumonia.         CT CHEST PULMONARY EMBOLISM W CONTRAST   Final Result   1. Suboptimal opacification of the pulmonary arteries with contrast.  No   central pulmonary emboli.   2. Multifocal pneumonia, most pronounced in the left lower lobe.   3. Fatty liver.         XR CHEST PORTABLE   Final Result   No focal infiltrates.           CT CHEST PULMONARY EMBOLISM W CONTRAST    Result Date: 12/28/2024  1. Suboptimal opacification of the pulmonary arteries with contrast.  No central pulmonary emboli. 2. Multifocal pneumonia, most pronounced in the left lower lobe. 3. Fatty liver. 4. Left adrenal nodule measuring 12 x 10 mm that is indeterminate.  Follow-up adrenal washout CT in 1 year is recommended.     XR CHEST PORTABLE    Result Date: 12/28/2024  EXAMINATION: ONE XRAY VIEW OF THE CHEST 12/28/2024 2:18 am COMPARISON: Chest radiograph 03/04/2022. HISTORY: ORDERING SYSTEM PROVIDED HISTORY: cough, rhonchi TECHNOLOGIST PROVIDED HISTORY: cough, rhonchi Reason for Exam: cough, rhonchi FINDINGS: Normal cardiomediastinal silhouette. No focal consolidation. No large pleural effusion or pneumothorax. No acute osseous findings.     No focal infiltrates.         EKG:     Electronically signed by Mohsin Mohammad Reza, MD on 12/29/2024 at 8:57 AM              
bilaterally     Labs:   Recent Labs     12/28/24  0330 12/29/24 0459   WBC 14.4* 16.5*   HGB 15.1 13.7   HCT 44.9 40.8    225     Recent Labs     12/28/24  0330 12/29/24 0459   * 135*   K 4.0 4.5   CL 94* 100   CO2 23 26   BUN 15 13   CREATININE 1.0 0.8   CALCIUM 9.9 9.3     Recent Labs     12/29/24 0459   AST 18   ALT 25   BILITOT 0.4   ALKPHOS 55     No results for input(s): \"INR\" in the last 72 hours.  No results for input(s): \"CKTOTAL\", \"TROPONINI\" in the last 72 hours.    Urinalysis:    Lab Results   Component Value Date/Time    NITRU NEGATIVE 08/12/2020 08:15 PM    WBCUA 0 TO 2 11/21/2017 03:30 PM    BACTERIA NOT REPORTED 11/21/2017 03:30 PM    RBCUA 2 TO 5 11/21/2017 03:30 PM    GLUCOSEU TRACE 08/12/2020 08:15 PM       Radiology:   XR CHEST PORTABLE   Preliminary Result   Slight interval progression of bibasilar airspace disease which could   represent atelectasis or pneumonia.         CT CHEST PULMONARY EMBOLISM W CONTRAST   Final Result   1. Suboptimal opacification of the pulmonary arteries with contrast.  No   central pulmonary emboli.   2. Multifocal pneumonia, most pronounced in the left lower lobe.   3. Fatty liver.         XR CHEST PORTABLE   Final Result   No focal infiltrates.           CT CHEST PULMONARY EMBOLISM W CONTRAST    Result Date: 12/28/2024  1. Suboptimal opacification of the pulmonary arteries with contrast.  No central pulmonary emboli. 2. Multifocal pneumonia, most pronounced in the left lower lobe. 3. Fatty liver. 4. Left adrenal nodule measuring 12 x 10 mm that is indeterminate.  Follow-up adrenal washout CT in 1 year is recommended.     XR CHEST PORTABLE    Result Date: 12/28/2024  EXAMINATION: ONE XRAY VIEW OF THE CHEST 12/28/2024 2:18 am COMPARISON: Chest radiograph 03/04/2022. HISTORY: ORDERING SYSTEM PROVIDED HISTORY: cough, rhonchi TECHNOLOGIST PROVIDED HISTORY: cough, rhonchi Reason for Exam: cough, rhonchi FINDINGS: Normal cardiomediastinal silhouette. No 
Influenza A/B nucleic acid assay.  This test is a multiplex Real-Time Reverse Transcriptase Polymerase Chain Reaction  (RT-PCR)-based in vitro diagnostic test intended for the qualitative detection of nucleic  acids from SARS-CoV-2,  influenza A, and influenza B in nasopharyngeal and nasal swab specimens.       Not Detected results do not preclude SARS-CoV-2 infection and should not be used as the sole   basis for patient management decisions.  Negative results must be combined with clinical observations, patient history, and  epidemiological information.       Influenza A DETECTED Abnormal     Influenza B Not Detected       Electronically signed by BENY Rodriguez CNP on 12/30/2024 at 11:32 AM      [facility]  BENY Rodriguez CNP  Perfect Serve messaging  OFFICE: [phone]    Thank you for allowing us to participate in the care of this patient. Please call with questions.     This note is created with the assistance of a speech recognition program.  While intending to generate a document that actually reflects the content of the visit, the document can still have some errors including those of syntax and sound a like substitutions which may escape proof reading.  In such instances, actual meaning can be extrapolated by contextual diversion.

## 2024-12-31 NOTE — DISCHARGE SUMMARY
[facility]  Hospitalist Discharge Summary        Patient: Jay Cantu  YOB: 1970  MRN: [id number]   Acct: [id number]    Primary Care Physician: Joey Ellis MD    Admit date  12/28/2024    Discharge date:  12/31/2024 11:50 AM    Chief Complaint on presentation :-  sob, cough    Discharge Assessment and Plan:-   Patient admitted with Influenza and pneumonia. Evaluated by pulmonary and ID.   Sent home on oral abx. Did not qualify for home o2.     Acute hypoxic respiratory failure likely secondary to influenza and multifocal pneumonia  Pulmonary following.  Continue wean off oxygen.  Continue IV rocephin, Linezolid and Tamiflu, DuoNebs, Solu-Medrol --> prednisone  Linezolid as concern for possible staph infection  Continues to use o2.   Pulm and ID following  Patient would like to go home but I am still concerned about his oxygen needs.  He is also developing some loose stools from antibiotics.  We will add probiotics.     Leukocytosis  Likely secondary to above.  Continue current regimen.     Diabetes mellitus type 2  Hyperglycemia.  High-dose insulin sliding scale added.  A1c uncontrolled.  Insulin regimen adjusted     Sleep apnea on CPAP  BPH  Continue Flomax    Initial H and P and Hospital course:-  54-year-old male with past medical history of diabetes, BPH, coming into the hospital for complaints of shortness of breath, cough, chest pain that started in the evening.  Patient has been sick for 2 days.  He was diagnosed with influenza recently and has been slowly worsening.  Last night he had multiple bouts of coughing and burning chest pain.  He has been taking over-the-counter medications and also went to an local urgent care.  He was initially getting better but then progressively acutely got worse.  In the emergency room, patient was hypoxic 84% on room air, tachycardic, low-grade fevers.  Overnight, in the ER patient did not have his CPAP hence his symptoms worsened.  Patient does

## 2024-12-31 NOTE — DISCHARGE INSTR - COC
Continuity of Care Form    Patient Name: Jay Cantu   :  1970  MRN:  [id number]    Admit date:  2024  Discharge date:  ***    Code Status Order: Full Code   Advance Directives:   Advance Care Flowsheet Documentation             Admitting Physician:  Valerie Phillips MD  PCP: Joey Ellis MD    Discharging Nurse: ***  Discharging Hospital Unit/Room#: 2103/2103-01  Discharging Unit Phone Number: ***    Emergency Contact:   Extended Emergency Contact Information  Primary Emergency Contact: Monica Cantu  Address: [address] [place] [address]  Home Phone: [phone]  Work Phone: [phone]  Mobile Phone: [phone]  Relation: Spouse    Past Surgical History:  Past Surgical History:   Procedure Laterality Date    LITHOTRIPSY         Immunization History:   Immunization History   Administered Date(s) Administered    COVID-19, PFIZER PURPLE top, DILUTE for use, (age 12 y+), 30mcg/0.3mL 2021, 2022    Influenza Virus Vaccine 10/20/2014    Influenza, AFLURIA (age 3 y+), FLUZONE, (age 6 mo+), Quadv MDV, 0.5mL 2017    Influenza, FLUARIX, FLULAVAL, FLUZONE (age 6 mo+) and AFLURIA, (age 3 y+), Quadv PF, 0.5mL 2018    Pneumococcal, PCV-13, PREVNAR 13, (age 6w+), IM, 0.5mL 2016, 2017    Pneumococcal, PPSV23, PNEUMOVAX 23, (age 2y+), SC/IM, 0.5mL 2017, 2019    TDaP, ADACEL (age 10y-64y), BOOSTRIX (age 10y+), IM, 0.5mL 2016       Active Problems:  Patient Active Problem List   Diagnosis Code    Kidney stone N20.0    MARCELLUS on CPAP G47.33    Vitamin D deficiency E55.9    Bilateral knee pain M25.561, M25.562    Derangement of meniscus M23.309    Flea bite W57.XXXA    Uncontrolled type 2 diabetes mellitus without complication, without long-term current use of insulin [id number]    Essential hypertension I10    Other hyperlipidemia E78.49    Fatigue R53.83    Pain of right heel M79.671    Dyslipidemia E78.5    Glucosuria R81    Candidal balanitis

## 2024-12-31 NOTE — FLOWSHEET NOTE
12/30/24 1830   Oxygen Therapy   SpO2 (S)  96 %  (after activity)   O2 Device (S)  None (Room air)

## 2024-12-31 NOTE — PLAN OF CARE
Problem: Respiratory - Adult  Goal: Achieves optimal ventilation and oxygenation  12/28/2024 2017 by Robel Rowland RCP  Outcome: Progressing     Problem: Respiratory - Adult  Goal: Able to breathe comfortably  Description: Able to breathe comfortably  12/28/2024 2017 by Robel Rowland RCP  Outcome: Progressing     Problem: Respiratory - Adult  Goal: Clear lung sounds  12/28/2024 2017 by Robel Rowland RCP  Outcome: Progressing     Problem: Respiratory - Adult  Goal: Adequate oxygenation  12/28/2024 2017 by Robel Rowland RCP  Outcome: Progressing     
  Problem: Respiratory - Adult  Goal: Achieves optimal ventilation and oxygenation  12/29/2024 0915 by Anamaria Nice RCP  Outcome: Progressing  12/29/2024 0547 by Aroldo Waldrop RN  Outcome: Progressing  12/28/2024 2017 by Robel Rowland RCP  Outcome: Progressing  Flowsheets (Taken 12/28/2024 2000 by Aroldo Waldrop RN)  Achieves optimal ventilation and oxygenation:   Assess for changes in respiratory status   Assess for changes in mentation and behavior   Position to facilitate oxygenation and minimize respiratory effort   Oxygen supplementation based on oxygen saturation or arterial blood gases   Initiate smoking cessation protocol as indicated   Encourage broncho-pulmonary hygiene including cough, deep breathe, incentive spirometry   Assess and instruct to report shortness of breath or any respiratory difficulty   Respiratory therapy support as indicated     Problem: Respiratory - Adult  Goal: Achieves optimal ventilation and oxygenation  12/29/2024 0915 by Anmaaria Nice RCP  Outcome: Progressing  12/29/2024 0547 by Aroldo Waldrop RN  Outcome: Progressing  12/28/2024 2017 by Robel Rowland RCP  Outcome: Progressing  Flowsheets (Taken 12/28/2024 2000 by Aroldo Waldrop RN)  Achieves optimal ventilation and oxygenation:   Assess for changes in respiratory status   Assess for changes in mentation and behavior   Position to facilitate oxygenation and minimize respiratory effort   Oxygen supplementation based on oxygen saturation or arterial blood gases   Initiate smoking cessation protocol as indicated   Encourage broncho-pulmonary hygiene including cough, deep breathe, incentive spirometry   Assess and instruct to report shortness of breath or any respiratory difficulty   Respiratory therapy support as indicated  Goal: Able to breathe comfortably  Description: Able to breathe comfortably  12/29/2024 0915 by Anamaria Nice RCP  Outcome: Progressing  12/28/2024 2017 by Robel Rowland RCP  Outcome: 
  Problem: Respiratory - Adult  Goal: Achieves optimal ventilation and oxygenation  12/29/2024 1817 by Mariposa Bruner, RN  Outcome: Progressing     Problem: Chronic Conditions and Co-morbidities  Goal: Patient's chronic conditions and co-morbidity symptoms are monitored and maintained or improved  12/29/2024 1817 by Mariposa Bruner, RN  Outcome: Progressing  Flowsheets (Taken 12/29/2024 0910)  Care Plan - Patient's Chronic Conditions and Co-Morbidity Symptoms are Monitored and Maintained or Improved:   Monitor and assess patient's chronic conditions and comorbid symptoms for stability, deterioration, or improvement   Collaborate with multidisciplinary team to address chronic and comorbid conditions and prevent exacerbation or deterioration     Problem: Discharge Planning  Goal: Discharge to home or other facility with appropriate resources  12/29/2024 1817 by Mariposa Bruner, RN  Outcome: Progressing  Flowsheets (Taken 12/29/2024 0910)  Discharge to home or other facility with appropriate resources:   Identify barriers to discharge with patient and caregiver   Arrange for needed discharge resources and transportation as appropriate   Identify discharge learning needs (meds, wound care, etc)   Refer to discharge planning if patient needs post-hospital services based on physician order or complex needs related to functional status, cognitive ability or social support system     Problem: Pain  Goal: Verbalizes/displays adequate comfort level or baseline comfort level  12/29/2024 1817 by Mariposa Bruner, RN  Outcome: Progressing     Problem: Neurosensory - Adult  Goal: Achieves stable or improved neurological status  12/29/2024 1817 by Mariposa Bruner, RN  Outcome: Progressing  Flowsheets (Taken 12/29/2024 0910)  Achieves stable or improved neurological status:   Assess for and report changes in neurological status   Initiate measures to prevent increased intracranial pressure     Problem: Cardiovascular - 
  Problem: Respiratory - Adult  Goal: Achieves optimal ventilation and oxygenation  12/29/2024 1958 by Harman Gaytan RCP  Outcome: Progressing     Problem: Respiratory - Adult  Goal: Able to breathe comfortably  Description: Able to breathe comfortably  12/29/2024 1958 by Harman Gaytan RCP  Outcome: Progressing     Problem: Respiratory - Adult  Goal: Clear lung sounds  12/29/2024 1958 by Harman Gaytan RCP  Outcome: Progressing     Problem: Respiratory - Adult  Goal: Adequate oxygenation  12/29/2024 1958 by Harman Gaytan RCP  Outcome: Progressing     
  Problem: Respiratory - Adult  Goal: Achieves optimal ventilation and oxygenation  12/30/2024 0505 by Samm Jones RN  Outcome: Progressing  Flowsheets (Taken 12/29/2024 2130)  Achieves optimal ventilation and oxygenation:   Assess for changes in respiratory status   Encourage broncho-pulmonary hygiene including cough, deep breathe, incentive spirometry   Assess for changes in mentation and behavior   Assess and instruct to report shortness of breath or any respiratory difficulty     Problem: Chronic Conditions and Co-morbidities  Goal: Patient's chronic conditions and co-morbidity symptoms are monitored and maintained or improved  12/30/2024 0505 by Samm Jones RN  Outcome: Progressing  Flowsheets (Taken 12/29/2024 2130)  Care Plan - Patient's Chronic Conditions and Co-Morbidity Symptoms are Monitored and Maintained or Improved:   Monitor and assess patient's chronic conditions and comorbid symptoms for stability, deterioration, or improvement   Collaborate with multidisciplinary team to address chronic and comorbid conditions and prevent exacerbation or deterioration   Update acute care plan with appropriate goals if chronic or comorbid symptoms are exacerbated and prevent overall improvement and discharge     Problem: Discharge Planning  Goal: Discharge to home or other facility with appropriate resources  12/30/2024 0505 by Samm Jones RN  Outcome: Progressing  Flowsheets (Taken 12/29/2024 2130)  Discharge to home or other facility with appropriate resources:   Identify barriers to discharge with patient and caregiver   Arrange for needed discharge resources and transportation as appropriate   Identify discharge learning needs (meds, wound care, etc)   Refer to discharge planning if patient needs post-hospital services based on physician order or complex needs related to functional status, cognitive ability or social support system     Problem: Pain  Goal: Verbalizes/displays adequate 
  Problem: Respiratory - Adult  Goal: Achieves optimal ventilation and oxygenation  12/30/2024 0802 by Shruthi Panda RCP  Outcome: Progressing  12/30/2024 0505 by Samm Jones, RN  Outcome: Progressing  Flowsheets (Taken 12/29/2024 2130)  Achieves optimal ventilation and oxygenation:   Assess for changes in respiratory status   Encourage broncho-pulmonary hygiene including cough, deep breathe, incentive spirometry   Assess for changes in mentation and behavior   Assess and instruct to report shortness of breath or any respiratory difficulty  12/29/2024 1958 by Harman Gaytan RCP  Outcome: Progressing  12/29/2024 1817 by Mariposa Bruner, RN  Outcome: Progressing  Goal: Able to breathe comfortably  Description: Able to breathe comfortably  12/30/2024 0802 by Shruthi Panda RCP  Outcome: Progressing  12/29/2024 1958 by Harman Gaytan RCP  Outcome: Progressing  Goal: Clear lung sounds  12/30/2024 0802 by Shruthi Panda RCP  Outcome: Progressing  12/29/2024 1958 by Harman Gaytan RCP  Outcome: Progressing  Goal: Adequate oxygenation  12/30/2024 0802 by Shruthi Pnada RCP  Outcome: Progressing  12/29/2024 1958 by Harman Gaytan RCP  Outcome: Progressing     
  Problem: Respiratory - Adult  Goal: Achieves optimal ventilation and oxygenation  12/30/2024 1544 by Carolina Allen, RN  Outcome: Progressing  Flowsheets (Taken 12/30/2024 0955)  Achieves optimal ventilation and oxygenation:   Assess for changes in respiratory status   Assess for changes in mentation and behavior   Position to facilitate oxygenation and minimize respiratory effort   Oxygen supplementation based on oxygen saturation or arterial blood gases   Encourage broncho-pulmonary hygiene including cough, deep breathe, incentive spirometry   Assess and instruct to report shortness of breath or any respiratory difficulty   Respiratory therapy support as indicated     Problem: Chronic Conditions and Co-morbidities  Goal: Patient's chronic conditions and co-morbidity symptoms are monitored and maintained or improved  12/30/2024 1544 by Carolina Allen, RN  Outcome: Progressing  Flowsheets (Taken 12/30/2024 0955)  Care Plan - Patient's Chronic Conditions and Co-Morbidity Symptoms are Monitored and Maintained or Improved:   Monitor and assess patient's chronic conditions and comorbid symptoms for stability, deterioration, or improvement   Collaborate with multidisciplinary team to address chronic and comorbid conditions and prevent exacerbation or deterioration   Update acute care plan with appropriate goals if chronic or comorbid symptoms are exacerbated and prevent overall improvement and discharge     Problem: Discharge Planning  Goal: Discharge to home or other facility with appropriate resources  12/30/2024 1544 by Carolina Allen, RN  Outcome: Progressing  Flowsheets (Taken 12/30/2024 0955)  Discharge to home or other facility with appropriate resources:   Identify barriers to discharge with patient and caregiver   Arrange for needed discharge resources and transportation as appropriate   Identify discharge learning needs (meds, wound care, etc)   Refer to discharge planning if patient needs post-hospital services 
  Problem: Respiratory - Adult  Goal: Achieves optimal ventilation and oxygenation  12/30/2024 2019 by Kimberly Benson RCP  Outcome: Progressing     Problem: Respiratory - Adult  Goal: Able to breathe comfortably  Description: Able to breathe comfortably  12/30/2024 2019 by Kimberly Benson RCP  Outcome: Progressing     Problem: Respiratory - Adult  Goal: Clear lung sounds  12/30/2024 2019 by Kimberly Benson RCP  Outcome: Progressing     Problem: Respiratory - Adult  Goal: Adequate oxygenation  12/30/2024 2019 by Kimberly Benson RCP  Outcome: Progressing     
  Problem: Respiratory - Adult  Goal: Achieves optimal ventilation and oxygenation  Outcome: Progressing     Problem: Respiratory - Adult  Goal: Able to breathe comfortably  Description: Able to breathe comfortably  Outcome: Progressing     Problem: Respiratory - Adult  Goal: Clear lung sounds  Outcome: Progressing     Problem: Respiratory - Adult  Goal: Adequate oxygenation  Outcome: Progressing     
No change  Pt reports significant improvement since admission  Wife in room overnight  Home cPAP at night, 4L during day  Tylenol given for headache  Plan to continue breathing trx, abx, steroids, tamiflu  Up independent  Elevated sugar 405, NP messaged, see MAR    Problem: Respiratory - Adult  Goal: Achieves optimal ventilation and oxygenation  12/29/2024 0547 by Aroldo Waldrop RN  Outcome: Progressing     Problem: Chronic Conditions and Co-morbidities  Goal: Patient's chronic conditions and co-morbidity symptoms are monitored and maintained or improved  12/29/2024 0547 by Aroldo Waldrop RN  Outcome: Progressing  Flowsheets (Taken 12/28/2024 2000)  Care Plan - Patient's Chronic Conditions and Co-Morbidity Symptoms are Monitored and Maintained or Improved:   Monitor and assess patient's chronic conditions and comorbid symptoms for stability, deterioration, or improvement   Collaborate with multidisciplinary team to address chronic and comorbid conditions and prevent exacerbation or deterioration   Update acute care plan with appropriate goals if chronic or comorbid symptoms are exacerbated and prevent overall improvement and discharge     Problem: Discharge Planning  Goal: Discharge to home or other facility with appropriate resources  12/29/2024 0547 by Aroldo Waldrop RN  Outcome: Progressing  Flowsheets (Taken 12/28/2024 2000)  Discharge to home or other facility with appropriate resources:   Identify barriers to discharge with patient and caregiver   Arrange for needed discharge resources and transportation as appropriate   Identify discharge learning needs (meds, wound care, etc)   Refer to discharge planning if patient needs post-hospital services based on physician order or complex needs related to functional status, cognitive ability or social support system     Problem: Pain  Goal: Verbalizes/displays adequate comfort level or baseline comfort level  12/29/2024 0547 by Aroldo Waldrop RN  Outcome: 
Patient on room air, tolerating well 95-97% Independent in room, calls appropriately with needs. Home bipap on.   Problem: Respiratory - Adult  Goal: Achieves optimal ventilation and oxygenation  12/31/2024 0214 by Raina Granda RN  Outcome: Progressing  Flowsheets (Taken 12/30/2024 2338)  Achieves optimal ventilation and oxygenation:   Assess for changes in respiratory status   Assess for changes in mentation and behavior   Position to facilitate oxygenation and minimize respiratory effort     Problem: Chronic Conditions and Co-morbidities  Goal: Patient's chronic conditions and co-morbidity symptoms are monitored and maintained or improved  12/31/2024 0214 by Raina Granda RN  Outcome: Progressing  Flowsheets (Taken 12/30/2024 2338)  Care Plan - Patient's Chronic Conditions and Co-Morbidity Symptoms are Monitored and Maintained or Improved:   Monitor and assess patient's chronic conditions and comorbid symptoms for stability, deterioration, or improvement   Collaborate with multidisciplinary team to address chronic and comorbid conditions and prevent exacerbation or deterioration     Problem: Discharge Planning  Goal: Discharge to home or other facility with appropriate resources  12/31/2024 0214 by Raina Granda RN  Outcome: Progressing  Flowsheets (Taken 12/30/2024 2338)  Discharge to home or other facility with appropriate resources:   Identify barriers to discharge with patient and caregiver   Arrange for needed discharge resources and transportation as appropriate   Identify discharge learning needs (meds, wound care, etc)   Refer to discharge planning if patient needs post-hospital services based on physician order or complex needs related to functional status, cognitive ability or social support system     Problem: Pain  Goal: Verbalizes/displays adequate comfort level or baseline comfort level  12/31/2024 0214 by Raina Granda, RN  Outcome: Progressing  Verbalizes/displays adequate comfort 
Pt has been resting since admission. Pt is A&Ox4 and gets up SB/ind. Pt is on 4L NC and bipap intermittently for sleeping. Pt normally wears home c-pap for night time. Pt is on IV solu-medrol and cefepime. Pt is positive for influenza A. Patient having pain on their head and rates it a 3. Pain interventions include relaxation techniques and medication. Patients goal for pain relief is  0, no pain . The need for pain and symptom management will be considered in the discharge planning process to ensure patients comfort. All questions and concerns addressed at this time. Safety maintained. Bed is locked and in the lowest position with the call light in reach.     Problem: Respiratory - Adult  Goal: Achieves optimal ventilation and oxygenation  12/28/2024 1844 by Isabel Zuluaga RN  Outcome: Progressing     Problem: Chronic Conditions and Co-morbidities  Goal: Patient's chronic conditions and co-morbidity symptoms are monitored and maintained or improved  Outcome: Progressing     Problem: Discharge Planning  Goal: Discharge to home or other facility with appropriate resources  Outcome: Progressing     Problem: Pain  Goal: Verbalizes/displays adequate comfort level or baseline comfort level  Outcome: Progressing     Problem: Neurosensory - Adult  Goal: Achieves stable or improved neurological status  Outcome: Progressing     Problem: Cardiovascular - Adult  Goal: Maintains optimal cardiac output and hemodynamic stability  Outcome: Progressing     Problem: Skin/Tissue Integrity - Adult  Goal: Skin integrity remains intact  Outcome: Progressing     Problem: Musculoskeletal - Adult  Goal: Return mobility to safest level of function  Outcome: Progressing     Problem: Gastrointestinal - Adult  Goal: Minimal or absence of nausea and vomiting  Outcome: Progressing     
at discharge  12/31/2024 1004 by Maite Mathis RN  Outcome: Adequate for Discharge  Flowsheets (Taken 12/31/2024 0842)  Absence of infection at discharge:   Assess and monitor for signs and symptoms of infection   Monitor lab/diagnostic results   Monitor all insertion sites i.e., indwelling lines, tubes and drains   Administer medications as ordered   Instruct and encourage patient and family to use good hand hygiene technique   Identify and instruct in appropriate isolation precautions for identified infection/condition  Goal: Absence of infection during hospitalization  12/31/2024 1004 by Maite Mathis RN  Outcome: Adequate for Discharge  Flowsheets (Taken 12/31/2024 0842)  Absence of infection during hospitalization:   Assess and monitor for signs and symptoms of infection   Monitor lab/diagnostic results   Monitor all insertion sites i.e., indwelling lines, tubes and drains   Administer medications as ordered   Instruct and encourage patient and family to use good hand hygiene technique   Identify and instruct in appropriate isolation precautions for identified infection/condition     Problem: Gastrointestinal - Adult  Goal: Maintains or returns to baseline bowel function  Outcome: Adequate for Discharge  Flowsheets (Taken 12/31/2024 0842)  Maintains or returns to baseline bowel function:   Assess bowel function   Encourage oral fluids to ensure adequate hydration

## 2024-12-31 NOTE — FLOWSHEET NOTE
12/30/24 1500   Oxygen Therapy   SpO2 (S)  98 %  (oxygen removed)   Pulse Oximeter Device Mode Continuous   O2 Device (S)  None (Room air)

## 2024-12-31 NOTE — RT PROTOCOL NOTE
RT Inhaler-Nebulizer Bronchodilator Protocol Note    There is a bronchodilator order in the chart from a provider indicating to follow the RT Bronchodilator Protocol and there is an “Initiate RT Inhaler-Nebulizer Bronchodilator Protocol” order as well (see protocol at bottom of note).    CXR Findings:  XR CHEST PORTABLE    Result Date: 12/30/2024  Slight interval progression of bibasilar airspace disease which could represent atelectasis or pneumonia.       The findings from the last RT Protocol Assessment were as follows:   History Pulmonary Disease: Smoker 15 pack years or more  Respiratory Pattern: Regular pattern and RR 12-20 bpm  Breath Sounds: Slightly diminished and/or crackles  Cough: Strong, productive  Indication for Bronchodilator Therapy: Decreased or absent breath sounds  Bronchodilator Assessment Score: 4    Aerosolized bronchodilator medication orders have been revised according to the RT Inhaler-Nebulizer Bronchodilator Protocol below.    Respiratory Therapist to perform RT Therapy Protocol Assessment initially then follow the protocol.  Repeat RT Therapy Protocol Assessment PRN for score 0-3 or on second treatment, BID, and PRN for scores above 3.    No Indications - adjust the frequency to every 6 hours PRN wheezing or bronchospasm, if no treatments needed after 48 hours then discontinue using Per Protocol order mode.     If indication present, adjust the RT bronchodilator orders based on the Bronchodilator Assessment Score as indicated below.  Use Inhaler orders unless patient has one or more of the following: on home nebulizer, not able to hold breath for 10 seconds, is not alert and oriented, cannot activate and use MDI correctly, or respiratory rate 25 breaths per minute or more, then use the equivalent nebulizer order(s) with same Frequency and PRN reasons based on the score.  If a patient is on this medication at home then do not decrease Frequency below that used at home.    0-3 - enter or

## 2025-01-01 LAB — M PNEUMO IGM SER QL IA: 0.21

## 2025-01-02 LAB
MICROORGANISM SPEC CULT: NORMAL
MICROORGANISM SPEC CULT: NORMAL
SERVICE CMNT-IMP: NORMAL
SERVICE CMNT-IMP: NORMAL
SPECIMEN DESCRIPTION: NORMAL
SPECIMEN DESCRIPTION: NORMAL